# Patient Record
Sex: MALE | Race: WHITE | NOT HISPANIC OR LATINO | Employment: OTHER | URBAN - METROPOLITAN AREA
[De-identification: names, ages, dates, MRNs, and addresses within clinical notes are randomized per-mention and may not be internally consistent; named-entity substitution may affect disease eponyms.]

---

## 2018-01-09 ENCOUNTER — CONVERSION ENCOUNTER (OUTPATIENT)
Dept: PODIATRY | Facility: CLINIC | Age: 53
End: 2018-01-09

## 2018-01-09 ENCOUNTER — OFFICE VISIT CONVERTED (OUTPATIENT)
Dept: PODIATRY | Facility: CLINIC | Age: 53
End: 2018-01-09
Attending: PODIATRIST

## 2018-01-23 ENCOUNTER — CONVERSION ENCOUNTER (OUTPATIENT)
Dept: PODIATRY | Facility: CLINIC | Age: 53
End: 2018-01-23

## 2018-01-23 ENCOUNTER — OFFICE VISIT CONVERTED (OUTPATIENT)
Dept: PODIATRY | Facility: CLINIC | Age: 53
End: 2018-01-23
Attending: PODIATRIST

## 2018-02-22 ENCOUNTER — OFFICE VISIT CONVERTED (OUTPATIENT)
Dept: PODIATRY | Facility: CLINIC | Age: 53
End: 2018-02-22
Attending: PODIATRIST

## 2018-03-15 ENCOUNTER — OFFICE VISIT CONVERTED (OUTPATIENT)
Dept: PODIATRY | Facility: CLINIC | Age: 53
End: 2018-03-15
Attending: PODIATRIST

## 2018-04-13 ENCOUNTER — OFFICE VISIT CONVERTED (OUTPATIENT)
Dept: PODIATRY | Facility: CLINIC | Age: 53
End: 2018-04-13
Attending: PODIATRIST

## 2018-05-04 ENCOUNTER — OFFICE VISIT CONVERTED (OUTPATIENT)
Dept: PODIATRY | Facility: CLINIC | Age: 53
End: 2018-05-04
Attending: PODIATRIST

## 2018-05-25 ENCOUNTER — OFFICE VISIT CONVERTED (OUTPATIENT)
Dept: PODIATRY | Facility: CLINIC | Age: 53
End: 2018-05-25
Attending: PODIATRIST

## 2018-06-27 ENCOUNTER — CONVERSION ENCOUNTER (OUTPATIENT)
Dept: OTHER | Facility: HOSPITAL | Age: 53
End: 2018-06-27

## 2018-06-27 ENCOUNTER — OFFICE VISIT CONVERTED (OUTPATIENT)
Dept: OTHER | Facility: HOSPITAL | Age: 53
End: 2018-06-27
Attending: NURSE PRACTITIONER

## 2018-07-27 ENCOUNTER — OFFICE VISIT CONVERTED (OUTPATIENT)
Dept: PODIATRY | Facility: CLINIC | Age: 53
End: 2018-07-27
Attending: PODIATRIST

## 2018-10-11 ENCOUNTER — OFFICE VISIT CONVERTED (OUTPATIENT)
Dept: OTHER | Facility: HOSPITAL | Age: 53
End: 2018-10-11
Attending: NURSE PRACTITIONER

## 2018-10-11 ENCOUNTER — CONVERSION ENCOUNTER (OUTPATIENT)
Dept: OTHER | Facility: HOSPITAL | Age: 53
End: 2018-10-11

## 2018-11-27 ENCOUNTER — OFFICE VISIT CONVERTED (OUTPATIENT)
Dept: OTHER | Facility: HOSPITAL | Age: 53
End: 2018-11-27
Attending: NURSE PRACTITIONER

## 2018-12-03 ENCOUNTER — OFFICE VISIT CONVERTED (OUTPATIENT)
Dept: NEUROLOGY | Facility: CLINIC | Age: 53
End: 2018-12-03
Attending: PSYCHIATRY & NEUROLOGY

## 2018-12-03 ENCOUNTER — CONVERSION ENCOUNTER (OUTPATIENT)
Dept: NEUROLOGY | Facility: CLINIC | Age: 53
End: 2018-12-03

## 2018-12-10 ENCOUNTER — CONVERSION ENCOUNTER (OUTPATIENT)
Dept: CARDIOLOGY | Facility: CLINIC | Age: 53
End: 2018-12-10
Attending: INTERNAL MEDICINE

## 2019-02-21 ENCOUNTER — OFFICE VISIT CONVERTED (OUTPATIENT)
Dept: OTHER | Facility: HOSPITAL | Age: 54
End: 2019-02-21
Attending: NURSE PRACTITIONER

## 2019-02-21 ENCOUNTER — CONVERSION ENCOUNTER (OUTPATIENT)
Dept: OTHER | Facility: HOSPITAL | Age: 54
End: 2019-02-21

## 2019-02-21 ENCOUNTER — HOSPITAL ENCOUNTER (OUTPATIENT)
Dept: OTHER | Facility: HOSPITAL | Age: 54
Discharge: HOME | End: 2019-02-21

## 2019-02-21 LAB
ALBUMIN SERPL-MCNC: 3.5 G/DL (ref 3.5–5)
ALBUMIN/GLOB SERPL: 1.1 {RATIO} (ref 1.4–2.6)
ALP SERPL-CCNC: 87 U/L (ref 56–119)
ALT SERPL-CCNC: 9 U/L (ref 10–40)
ANION GAP SERPL CALC-SCNC: 15 MMOL/L (ref 8–19)
AST SERPL-CCNC: 10 U/L (ref 15–50)
BASOPHILS # BLD AUTO: 0.06 10*3/UL (ref 0–0.2)
BASOPHILS NFR BLD AUTO: 0.7 % (ref 0–3)
BILIRUB SERPL-MCNC: 0.28 MG/DL (ref 0.2–1.3)
BUN SERPL-MCNC: 35 MG/DL (ref 5–25)
BUN/CREAT SERPL: 14 {RATIO} (ref 6–20)
CALCIUM SERPL-MCNC: 9.7 MG/DL (ref 8.7–10.4)
CHLORIDE SERPL-SCNC: 107 MMOL/L (ref 99–111)
CONV ABS IMM GRAN: 0.02 10*3/UL (ref 0–0.2)
CONV CO2: 23 MMOL/L (ref 22–32)
CONV IMMATURE GRAN: 0.2 % (ref 0–1.8)
CONV TOTAL PROTEIN: 6.6 G/DL (ref 6.3–8.2)
CREAT UR-MCNC: 2.45 MG/DL (ref 0.7–1.2)
DEPRECATED RDW RBC AUTO: 39.5 FL (ref 35.1–43.9)
EOSINOPHIL # BLD AUTO: 0.31 10*3/UL (ref 0–0.7)
EOSINOPHIL # BLD AUTO: 3.5 % (ref 0–7)
ERYTHROCYTE [DISTWIDTH] IN BLOOD BY AUTOMATED COUNT: 11.7 % (ref 11.6–14.4)
EST. AVERAGE GLUCOSE BLD GHB EST-MCNC: 189 MG/DL
FOLATE SERPL-MCNC: 10.4 NG/ML (ref 4.8–20)
GFR SERPLBLD BASED ON 1.73 SQ M-ARVRAT: 29 ML/MIN/{1.73_M2}
GLOBULIN UR ELPH-MCNC: 3.1 G/DL (ref 2–3.5)
GLUCOSE SERPL-MCNC: 198 MG/DL (ref 70–99)
HBA1C MFR BLD: 13.5 G/DL (ref 14–18)
HBA1C MFR BLD: 8.2 % (ref 3.5–5.7)
HCT VFR BLD AUTO: 40.3 % (ref 42–52)
LYMPHOCYTES # BLD AUTO: 2.28 10*3/UL (ref 1–5)
MCH RBC QN AUTO: 30.6 PG (ref 27–31)
MCHC RBC AUTO-ENTMCNC: 33.5 G/DL (ref 33–37)
MCV RBC AUTO: 91.4 FL (ref 80–96)
MONOCYTES # BLD AUTO: 0.69 10*3/UL (ref 0.2–1.2)
MONOCYTES NFR BLD AUTO: 7.8 % (ref 3–10)
NEUTROPHILS # BLD AUTO: 5.49 10*3/UL (ref 2–8)
NEUTROPHILS NFR BLD AUTO: 62 % (ref 30–85)
NRBC CBCN: 0 % (ref 0–0.7)
OSMOLALITY SERPL CALC.SUM OF ELEC: 304 MOSM/KG (ref 273–304)
PLATELET # BLD AUTO: 275 10*3/UL (ref 130–400)
PMV BLD AUTO: 12 FL (ref 9.4–12.4)
POTASSIUM SERPL-SCNC: 5.2 MMOL/L (ref 3.5–5.3)
RBC # BLD AUTO: 4.41 10*6/UL (ref 4.7–6.1)
SODIUM SERPL-SCNC: 140 MMOL/L (ref 135–147)
T4 FREE SERPL-MCNC: 1.5 NG/DL (ref 0.9–1.8)
TSH SERPL-ACNC: 0.48 M[IU]/L (ref 0.27–4.2)
VARIANT LYMPHS NFR BLD MANUAL: 25.8 % (ref 20–45)
VIT B12 SERPL-MCNC: 454 PG/ML (ref 211–911)
WBC # BLD AUTO: 8.85 10*3/UL (ref 4.8–10.8)

## 2019-02-22 LAB
FERRITIN SERPL-MCNC: 468 NG/ML (ref 30–300)
IRON SATN MFR SERPL: 23 % (ref 20–55)
IRON SERPL-MCNC: 53 UG/DL (ref 70–180)
TIBC SERPL-MCNC: 232 UG/DL (ref 245–450)
TRANSFERRIN SERPL-MCNC: 162 MG/DL (ref 215–365)

## 2019-02-25 LAB
CONV ANTI MICROSOMAL AB: 25 IU/ML (ref 0–34)
THYROGLOBULIN ANTIBODY: <1 IU/ML (ref 0–0.9)

## 2019-03-21 ENCOUNTER — OFFICE VISIT CONVERTED (OUTPATIENT)
Dept: OTHER | Facility: HOSPITAL | Age: 54
End: 2019-03-21
Attending: INTERNAL MEDICINE

## 2019-04-18 ENCOUNTER — HOSPITAL ENCOUNTER (OUTPATIENT)
Dept: GENERAL RADIOLOGY | Facility: HOSPITAL | Age: 54
Discharge: HOME | End: 2019-04-18

## 2019-05-08 ENCOUNTER — OFFICE VISIT CONVERTED (OUTPATIENT)
Dept: OTHER | Facility: HOSPITAL | Age: 54
End: 2019-05-08
Attending: NURSE PRACTITIONER

## 2019-05-08 ENCOUNTER — CONVERSION ENCOUNTER (OUTPATIENT)
Dept: OTHER | Facility: HOSPITAL | Age: 54
End: 2019-05-08

## 2019-05-08 ENCOUNTER — HOSPITAL ENCOUNTER (OUTPATIENT)
Dept: OTHER | Facility: HOSPITAL | Age: 54
Discharge: HOME | End: 2019-05-08

## 2019-05-08 LAB
EST. AVERAGE GLUCOSE BLD GHB EST-MCNC: 131 MG/DL
HBA1C MFR BLD: 6.2 % (ref 3.5–5.7)

## 2019-06-04 ENCOUNTER — OFFICE VISIT CONVERTED (OUTPATIENT)
Dept: CARDIOLOGY | Facility: CLINIC | Age: 54
End: 2019-06-04
Attending: SPECIALIST

## 2019-06-28 ENCOUNTER — OFFICE VISIT CONVERTED (OUTPATIENT)
Dept: NEUROLOGY | Facility: CLINIC | Age: 54
End: 2019-06-28
Attending: PSYCHIATRY & NEUROLOGY

## 2019-08-02 ENCOUNTER — CONVERSION ENCOUNTER (OUTPATIENT)
Dept: OTHER | Facility: HOSPITAL | Age: 54
End: 2019-08-02

## 2019-08-02 ENCOUNTER — OFFICE VISIT CONVERTED (OUTPATIENT)
Dept: OTHER | Facility: HOSPITAL | Age: 54
End: 2019-08-02
Attending: NURSE PRACTITIONER

## 2019-09-13 ENCOUNTER — OFFICE VISIT CONVERTED (OUTPATIENT)
Dept: OTHER | Facility: HOSPITAL | Age: 54
End: 2019-09-13
Attending: NURSE PRACTITIONER

## 2019-09-13 ENCOUNTER — CONVERSION ENCOUNTER (OUTPATIENT)
Dept: OTHER | Facility: HOSPITAL | Age: 54
End: 2019-09-13

## 2020-06-03 ENCOUNTER — TELEPHONE CONVERTED (OUTPATIENT)
Dept: GASTROENTEROLOGY | Facility: CLINIC | Age: 55
End: 2020-06-03
Attending: NURSE PRACTITIONER

## 2020-07-20 ENCOUNTER — HOSPITAL ENCOUNTER (OUTPATIENT)
Dept: ULTRASOUND IMAGING | Facility: HOSPITAL | Age: 55
Discharge: HOME | End: 2020-07-20
Attending: INTERNAL MEDICINE

## 2020-07-20 LAB
ABO GROUP BLD: NORMAL
ALBUMIN SERPL-MCNC: 2.6 G/DL (ref 3.5–5)
ALBUMIN/GLOB SERPL: 1 {RATIO} (ref 1.4–2.6)
ALP SERPL-CCNC: 107 U/L (ref 56–119)
ALT SERPL-CCNC: 7 U/L (ref 10–40)
ANION GAP SERPL CALC-SCNC: 20 MMOL/L (ref 8–19)
APTT BLD: 24.5 S (ref 22.2–34.2)
AST SERPL-CCNC: 10 U/L (ref 15–50)
BASOPHILS # BLD AUTO: 0.06 10*3/UL (ref 0–0.2)
BASOPHILS NFR BLD AUTO: 0.6 % (ref 0–3)
BILIRUB SERPL-MCNC: <0.15 MG/DL (ref 0.2–1.3)
BLD GP AB SCN SERPL QL: NORMAL
BUN SERPL-MCNC: 38 MG/DL (ref 5–25)
BUN/CREAT SERPL: 9 {RATIO} (ref 6–20)
CALCIUM SERPL-MCNC: 9.4 MG/DL (ref 8.7–10.4)
CHLORIDE SERPL-SCNC: 107 MMOL/L (ref 99–111)
CONV ABD CONTROL: NORMAL
CONV ABS IMM GRAN: 0.02 10*3/UL (ref 0–0.2)
CONV CO2: 18 MMOL/L (ref 22–32)
CONV IMMATURE GRAN: 0.2 % (ref 0–1.8)
CONV TOTAL PROTEIN: 5.2 G/DL (ref 6.3–8.2)
CREAT UR-MCNC: 4.13 MG/DL (ref 0.7–1.2)
DEPRECATED RDW RBC AUTO: 41 FL (ref 35.1–43.9)
EOSINOPHIL # BLD AUTO: 0.63 10*3/UL (ref 0–0.7)
EOSINOPHIL # BLD AUTO: 6.2 % (ref 0–7)
ERYTHROCYTE [DISTWIDTH] IN BLOOD BY AUTOMATED COUNT: 11.7 % (ref 11.6–14.4)
GFR SERPLBLD BASED ON 1.73 SQ M-ARVRAT: 15 ML/MIN/{1.73_M2}
GLOBULIN UR ELPH-MCNC: 2.6 G/DL (ref 2–3.5)
GLUCOSE SERPL-MCNC: 172 MG/DL (ref 70–99)
HCT VFR BLD AUTO: 31.5 % (ref 42–52)
HGB BLD-MCNC: 10.3 G/DL (ref 14–18)
INR PPP: 0.85 (ref 2–3)
LYMPHOCYTES # BLD AUTO: 1.64 10*3/UL (ref 1–5)
LYMPHOCYTES NFR BLD AUTO: 16.2 % (ref 20–45)
MCH RBC QN AUTO: 31.3 PG (ref 27–31)
MCHC RBC AUTO-ENTMCNC: 32.7 G/DL (ref 33–37)
MCV RBC AUTO: 95.7 FL (ref 80–96)
MONOCYTES # BLD AUTO: 0.58 10*3/UL (ref 0.2–1.2)
MONOCYTES NFR BLD AUTO: 5.7 % (ref 3–10)
NEUTROPHILS # BLD AUTO: 7.21 10*3/UL (ref 2–8)
NEUTROPHILS NFR BLD AUTO: 71.1 % (ref 30–85)
NRBC CBCN: 0 % (ref 0–0.7)
OSMOLALITY SERPL CALC.SUM OF ELEC: 303 MOSM/KG (ref 273–304)
PLATELET # BLD AUTO: 258 10*3/UL (ref 130–400)
PMV BLD AUTO: 9.9 FL (ref 9.4–12.4)
POTASSIUM SERPL-SCNC: 4.6 MMOL/L (ref 3.5–5.3)
PROTHROMBIN TIME: 9.4 S (ref 9.4–12)
RBC # BLD AUTO: 3.29 10*6/UL (ref 4.7–6.1)
RH BLD: NORMAL
SODIUM SERPL-SCNC: 140 MMOL/L (ref 135–147)
WBC # BLD AUTO: 10.14 10*3/UL (ref 4.8–10.8)

## 2021-01-13 ENCOUNTER — HOSPITAL ENCOUNTER (OUTPATIENT)
Dept: GENERAL RADIOLOGY | Facility: HOSPITAL | Age: 56
Discharge: HOME | End: 2021-01-13
Attending: FAMILY MEDICINE

## 2021-01-27 ENCOUNTER — HOSPITAL ENCOUNTER (OUTPATIENT)
Dept: PREADMISSION TESTING | Facility: HOSPITAL | Age: 56
Discharge: HOME | End: 2021-01-27
Attending: SURGERY

## 2021-01-27 LAB — SARS-COV-2 RNA SPEC QL NAA+PROBE: NOT DETECTED

## 2021-01-29 ENCOUNTER — HOSPITAL ENCOUNTER (OUTPATIENT)
Dept: PERIOP | Facility: HOSPITAL | Age: 56
Setting detail: HOSPITAL OUTPATIENT SURGERY
Discharge: HOME | End: 2021-01-29
Attending: SURGERY

## 2021-01-29 LAB
ANION GAP SERPL CALC-SCNC: 12 MMOL/L (ref 8–19)
BASOPHILS # BLD AUTO: 0.09 10*3/UL (ref 0–0.2)
BASOPHILS NFR BLD AUTO: 1 % (ref 0–3)
BUN SERPL-MCNC: 23 MG/DL (ref 5–25)
BUN/CREAT SERPL: 5 {RATIO} (ref 6–20)
CALCIUM SERPL-MCNC: 8.2 MG/DL (ref 8.7–10.4)
CHLORIDE SERPL-SCNC: 105 MMOL/L (ref 99–111)
CONV ABS IMM GRAN: 0.02 10*3/UL (ref 0–0.2)
CONV CO2: 25 MMOL/L (ref 22–32)
CONV IMMATURE GRAN: 0.2 % (ref 0–1.8)
CREAT UR-MCNC: 4.55 MG/DL (ref 0.7–1.2)
DEPRECATED RDW RBC AUTO: 45.1 FL (ref 35.1–43.9)
EOSINOPHIL # BLD AUTO: 0.44 10*3/UL (ref 0–0.7)
EOSINOPHIL # BLD AUTO: 5.1 % (ref 0–7)
ERYTHROCYTE [DISTWIDTH] IN BLOOD BY AUTOMATED COUNT: 13.2 % (ref 11.6–14.4)
GFR SERPLBLD BASED ON 1.73 SQ M-ARVRAT: 14 ML/MIN/{1.73_M2}
GLUCOSE BLD-MCNC: 128 MG/DL (ref 70–99)
GLUCOSE SERPL-MCNC: 162 MG/DL (ref 70–99)
HCT VFR BLD AUTO: 29.5 % (ref 42–52)
HGB BLD-MCNC: 9.7 G/DL (ref 14–18)
LYMPHOCYTES # BLD AUTO: 1.42 10*3/UL (ref 1–5)
LYMPHOCYTES NFR BLD AUTO: 16.4 % (ref 20–45)
MCH RBC QN AUTO: 31 PG (ref 27–31)
MCHC RBC AUTO-ENTMCNC: 32.9 G/DL (ref 33–37)
MCV RBC AUTO: 94.2 FL (ref 80–96)
MONOCYTES # BLD AUTO: 0.61 10*3/UL (ref 0.2–1.2)
MONOCYTES NFR BLD AUTO: 7 % (ref 3–10)
NEUTROPHILS # BLD AUTO: 6.1 10*3/UL (ref 2–8)
NEUTROPHILS NFR BLD AUTO: 70.3 % (ref 30–85)
NRBC CBCN: 0 % (ref 0–0.7)
OSMOLALITY SERPL CALC.SUM OF ELEC: 293 MOSM/KG (ref 273–304)
PLATELET # BLD AUTO: 239 10*3/UL (ref 130–400)
PMV BLD AUTO: 9.8 FL (ref 9.4–12.4)
POTASSIUM SERPL-SCNC: 4.1 MMOL/L (ref 3.5–5.3)
RBC # BLD AUTO: 3.13 10*6/UL (ref 4.7–6.1)
SODIUM SERPL-SCNC: 138 MMOL/L (ref 135–147)
WBC # BLD AUTO: 8.68 10*3/UL (ref 4.8–10.8)

## 2021-03-09 ENCOUNTER — CONVERSION ENCOUNTER (OUTPATIENT)
Dept: SURGERY | Facility: CLINIC | Age: 56
End: 2021-03-09

## 2021-03-09 ENCOUNTER — OFFICE VISIT CONVERTED (OUTPATIENT)
Dept: SURGERY | Facility: CLINIC | Age: 56
End: 2021-03-09
Attending: SURGERY

## 2021-03-12 ENCOUNTER — HOSPITAL ENCOUNTER (OUTPATIENT)
Dept: PREADMISSION TESTING | Facility: HOSPITAL | Age: 56
Discharge: HOME | End: 2021-03-12
Attending: SURGERY

## 2021-03-12 LAB — SARS-COV-2 RNA SPEC QL NAA+PROBE: NOT DETECTED

## 2021-03-19 ENCOUNTER — HOSPITAL ENCOUNTER (OUTPATIENT)
Dept: PERIOP | Facility: HOSPITAL | Age: 56
Setting detail: HOSPITAL OUTPATIENT SURGERY
Discharge: HOME | End: 2021-03-19
Attending: SURGERY

## 2021-03-19 LAB
ANION GAP SERPL CALC-SCNC: 15 MMOL/L (ref 8–19)
BASOPHILS # BLD AUTO: 0.07 10*3/UL (ref 0–0.2)
BASOPHILS NFR BLD AUTO: 0.7 % (ref 0–3)
BUN SERPL-MCNC: 16 MG/DL (ref 5–25)
BUN/CREAT SERPL: 5 {RATIO} (ref 6–20)
CALCIUM SERPL-MCNC: 8.6 MG/DL (ref 8.7–10.4)
CHLORIDE SERPL-SCNC: 99 MMOL/L (ref 99–111)
CONV ABS IMM GRAN: 0.02 10*3/UL (ref 0–0.2)
CONV CO2: 25 MMOL/L (ref 22–32)
CONV IMMATURE GRAN: 0.2 % (ref 0–1.8)
CREAT UR-MCNC: 3.43 MG/DL (ref 0.7–1.2)
DEPRECATED RDW RBC AUTO: 43.2 FL (ref 35.1–43.9)
EOSINOPHIL # BLD AUTO: 0.21 10*3/UL (ref 0–0.7)
EOSINOPHIL # BLD AUTO: 2.1 % (ref 0–7)
ERYTHROCYTE [DISTWIDTH] IN BLOOD BY AUTOMATED COUNT: 12.6 % (ref 11.6–14.4)
GFR SERPLBLD BASED ON 1.73 SQ M-ARVRAT: 19 ML/MIN/{1.73_M2}
GLUCOSE BLD-MCNC: 165 MG/DL (ref 70–99)
GLUCOSE SERPL-MCNC: 220 MG/DL (ref 70–99)
HCT VFR BLD AUTO: 39.4 % (ref 42–52)
HGB BLD-MCNC: 12.7 G/DL (ref 14–18)
LYMPHOCYTES # BLD AUTO: 0.91 10*3/UL (ref 1–5)
LYMPHOCYTES NFR BLD AUTO: 9 % (ref 20–45)
MCH RBC QN AUTO: 30.2 PG (ref 27–31)
MCHC RBC AUTO-ENTMCNC: 32.2 G/DL (ref 33–37)
MCV RBC AUTO: 93.6 FL (ref 80–96)
MONOCYTES # BLD AUTO: 0.73 10*3/UL (ref 0.2–1.2)
MONOCYTES NFR BLD AUTO: 7.2 % (ref 3–10)
NEUTROPHILS # BLD AUTO: 8.18 10*3/UL (ref 2–8)
NEUTROPHILS NFR BLD AUTO: 80.8 % (ref 30–85)
NRBC CBCN: 0 % (ref 0–0.7)
OSMOLALITY SERPL CALC.SUM OF ELEC: 286 MOSM/KG (ref 273–304)
PLATELET # BLD AUTO: 283 10*3/UL (ref 130–400)
PMV BLD AUTO: 9.4 FL (ref 9.4–12.4)
POTASSIUM SERPL-SCNC: 4.6 MMOL/L (ref 3.5–5.3)
RBC # BLD AUTO: 4.21 10*6/UL (ref 4.7–6.1)
SODIUM SERPL-SCNC: 134 MMOL/L (ref 135–147)
WBC # BLD AUTO: 10.12 10*3/UL (ref 4.8–10.8)

## 2021-03-23 ENCOUNTER — HOSPITAL ENCOUNTER (OUTPATIENT)
Dept: CARDIOLOGY | Facility: HOSPITAL | Age: 56
Discharge: HOME | End: 2021-03-23
Attending: SURGERY

## 2021-03-26 ENCOUNTER — HOSPITAL ENCOUNTER (OUTPATIENT)
Dept: PREADMISSION TESTING | Facility: HOSPITAL | Age: 56
Discharge: HOME | End: 2021-03-26
Attending: SURGERY

## 2021-03-26 LAB — SARS-COV-2 RNA SPEC QL NAA+PROBE: NOT DETECTED

## 2021-03-31 ENCOUNTER — HOSPITAL ENCOUNTER (OUTPATIENT)
Dept: PERIOP | Facility: HOSPITAL | Age: 56
Setting detail: HOSPITAL OUTPATIENT SURGERY
Discharge: HOME | End: 2021-03-31
Attending: SURGERY

## 2021-03-31 LAB
ANION GAP SERPL CALC-SCNC: 14 MMOL/L (ref 8–19)
APTT BLD: 25.2 S (ref 22.2–34.2)
BASOPHILS # BLD AUTO: 0.11 10*3/UL (ref 0–0.2)
BASOPHILS NFR BLD AUTO: 1.3 % (ref 0–3)
BUN SERPL-MCNC: 30 MG/DL (ref 5–25)
BUN/CREAT SERPL: 5 {RATIO} (ref 6–20)
CALCIUM SERPL-MCNC: 9.1 MG/DL (ref 8.7–10.4)
CHLORIDE SERPL-SCNC: 105 MMOL/L (ref 99–111)
CONV ABS IMM GRAN: 0.02 10*3/UL (ref 0–0.2)
CONV CO2: 24 MMOL/L (ref 22–32)
CONV IMMATURE GRAN: 0.2 % (ref 0–1.8)
CREAT UR-MCNC: 5.66 MG/DL (ref 0.7–1.2)
DEPRECATED RDW RBC AUTO: 43.1 FL (ref 35.1–43.9)
EOSINOPHIL # BLD AUTO: 0.46 10*3/UL (ref 0–0.7)
EOSINOPHIL # BLD AUTO: 5.3 % (ref 0–7)
ERYTHROCYTE [DISTWIDTH] IN BLOOD BY AUTOMATED COUNT: 12.2 % (ref 11.6–14.4)
GFR SERPLBLD BASED ON 1.73 SQ M-ARVRAT: 10 ML/MIN/{1.73_M2}
GLUCOSE BLD-MCNC: 63 MG/DL (ref 70–99)
GLUCOSE BLD-MCNC: 79 MG/DL (ref 70–99)
GLUCOSE SERPL-MCNC: 117 MG/DL (ref 70–99)
HCT VFR BLD AUTO: 39.7 % (ref 42–52)
HGB BLD-MCNC: 12.6 G/DL (ref 14–18)
INR PPP: 0.85 (ref 2–3)
LYMPHOCYTES # BLD AUTO: 1.44 10*3/UL (ref 1–5)
LYMPHOCYTES NFR BLD AUTO: 16.5 % (ref 20–45)
MCH RBC QN AUTO: 30.5 PG (ref 27–31)
MCHC RBC AUTO-ENTMCNC: 31.7 G/DL (ref 33–37)
MCV RBC AUTO: 96.1 FL (ref 80–96)
MONOCYTES # BLD AUTO: 0.57 10*3/UL (ref 0.2–1.2)
MONOCYTES NFR BLD AUTO: 6.5 % (ref 3–10)
NEUTROPHILS # BLD AUTO: 6.12 10*3/UL (ref 2–8)
NEUTROPHILS NFR BLD AUTO: 70.2 % (ref 30–85)
NRBC CBCN: 0 % (ref 0–0.7)
OSMOLALITY SERPL CALC.SUM OF ELEC: 293 MOSM/KG (ref 273–304)
PLATELET # BLD AUTO: 248 10*3/UL (ref 130–400)
PMV BLD AUTO: 9.5 FL (ref 9.4–12.4)
POTASSIUM SERPL-SCNC: 5.3 MMOL/L (ref 3.5–5.3)
PROTHROMBIN TIME: 9.6 S (ref 9.4–12)
RBC # BLD AUTO: 4.13 10*6/UL (ref 4.7–6.1)
SODIUM SERPL-SCNC: 138 MMOL/L (ref 135–147)
WBC # BLD AUTO: 8.72 10*3/UL (ref 4.8–10.8)

## 2021-04-12 ENCOUNTER — HOSPITAL ENCOUNTER (OUTPATIENT)
Dept: PERIOP | Facility: HOSPITAL | Age: 56
Discharge: HOME | End: 2021-04-12
Attending: SURGERY

## 2021-04-12 LAB
ANION GAP SERPL CALC-SCNC: 14 MMOL/L (ref 8–19)
APTT BLD: 24.2 S (ref 22.2–34.2)
BASOPHILS # BLD AUTO: 0.1 10*3/UL (ref 0–0.2)
BASOPHILS NFR BLD AUTO: 1.1 % (ref 0–3)
BUN SERPL-MCNC: 41 MG/DL (ref 5–25)
BUN/CREAT SERPL: 7 {RATIO} (ref 6–20)
CALCIUM SERPL-MCNC: 8.7 MG/DL (ref 8.7–10.4)
CHLORIDE SERPL-SCNC: 105 MMOL/L (ref 99–111)
CONV ABS IMM GRAN: 0.02 10*3/UL (ref 0–0.2)
CONV CO2: 23 MMOL/L (ref 22–32)
CONV IMMATURE GRAN: 0.2 % (ref 0–1.8)
CREAT UR-MCNC: 6.25 MG/DL (ref 0.7–1.2)
DEPRECATED RDW RBC AUTO: 43.4 FL (ref 35.1–43.9)
EOSINOPHIL # BLD AUTO: 0.65 10*3/UL (ref 0–0.7)
EOSINOPHIL # BLD AUTO: 6.9 % (ref 0–7)
ERYTHROCYTE [DISTWIDTH] IN BLOOD BY AUTOMATED COUNT: 12.4 % (ref 11.6–14.4)
GFR SERPLBLD BASED ON 1.73 SQ M-ARVRAT: 9 ML/MIN/{1.73_M2}
GLUCOSE SERPL-MCNC: 172 MG/DL (ref 70–99)
HCT VFR BLD AUTO: 37.8 % (ref 42–52)
HGB BLD-MCNC: 12 G/DL (ref 14–18)
INR PPP: 0.86 (ref 2–3)
LYMPHOCYTES # BLD AUTO: 1.21 10*3/UL (ref 1–5)
LYMPHOCYTES NFR BLD AUTO: 12.8 % (ref 20–45)
MCH RBC QN AUTO: 30.4 PG (ref 27–31)
MCHC RBC AUTO-ENTMCNC: 31.7 G/DL (ref 33–37)
MCV RBC AUTO: 95.7 FL (ref 80–96)
MONOCYTES # BLD AUTO: 0.52 10*3/UL (ref 0.2–1.2)
MONOCYTES NFR BLD AUTO: 5.5 % (ref 3–10)
NEUTROPHILS # BLD AUTO: 6.93 10*3/UL (ref 2–8)
NEUTROPHILS NFR BLD AUTO: 73.5 % (ref 30–85)
NRBC CBCN: 0 % (ref 0–0.7)
OSMOLALITY SERPL CALC.SUM OF ELEC: 298 MOSM/KG (ref 273–304)
PLATELET # BLD AUTO: 232 10*3/UL (ref 130–400)
PMV BLD AUTO: 9.7 FL (ref 9.4–12.4)
POTASSIUM SERPL-SCNC: 4.7 MMOL/L (ref 3.5–5.3)
PROTHROMBIN TIME: 9.7 S (ref 9.4–12)
RBC # BLD AUTO: 3.95 10*6/UL (ref 4.7–6.1)
SODIUM SERPL-SCNC: 137 MMOL/L (ref 135–147)
WBC # BLD AUTO: 9.43 10*3/UL (ref 4.8–10.8)

## 2021-05-10 NOTE — H&P
History and Physical      Patient Name: Michael Ponce   Patient ID: [id number]   Sex: Male   YOB: 1965    Primary Care Provider: Kymberly Otto MD   Referring Provider: Kymberly Otto MD    Visit Date: March 9, 2021    Provider: Rian Scott MD   Location: [facility]   Location Address: [address] [place] [address]  [id number]   Location Phone: [phone]          Chief Complaint  · hemodialysis cath      History Of Present Illness  Michael Ponce is a 55 year old /White male who follows-up after PD catheter placement. He follows-up today for evaluation for removal of his PD catheter. The patient reports that after the PD catheter was placed, he has had a stroke and some other issues and he is unable to care for PD catheter by himself at home. He feels more comfortable getting hemodialysis and he has been converted to hemodialysis. He had a TDC at the same time I placed the peritoneal dialysis catheter. Seeing as he more comfortable with hemodialysis, he just wants to go ahead and remove the PD cath although he reports the PD catheter flushed and worked appropriately without any difficulties.       Past Medical History  Ankle pain; Arthritis; Asthma; Bladder problem; Congestive heart failure; COPD (chronic obstructive pulmonary disease); Decubitus ulcer of foot, stage 1; Diabetes; Diabetes mellitus, insulin dependent (IDDM), controlled; Foot amputation status, right; GERD; GERD (gastroesophageal reflux disease); High blood pressure; High cholesterol; Kidney Disease; Leg pain; Leg swelling; Limb Pain; Limb Swelling; Migraine; Muscle cramps; Numbness in feet; Polyneuropathy; Right foot pain; Screening for prostate cancer; Stroke; Type 1 diabetes mellitus; Wound dehiscence         Past Surgical History  *Metal Implant; Foot surgery; Fracture Repair; Joint Surgery; Neck Surgery         Medication List  hydrocodone-acetaminophen 7.5-325 mg oral tablet;  "losartan 50 mg oral tablet; Pepcid AC 20 mg oral tablet         Allergy List  I.V. Dye; PENICILLINS; Red Dye; SULFA (SULFONAMIDES)       Allergies Reconciled  Family Medical History  Stroke; Heart Disease; Cancer, Unspecified; Diabetes, unspecified type; Diabetes Mellitus, Type II; No family history of colorectal cancer; Family history of Arthritis; Family history of cancer; Family history of stroke; Family history of heart disease; Family history of diabetes mellitus         Social History  Alcohol (Never); Denies substance abuse (Never); lives with children; ; Retired; Tobacco (Current every day)         Immunizations  Name Date Admin   Influenza 11/01/2018         Review of Systems  · Gastrointestinal  o Denies  o : nausea, vomiting, diarrhea, constipation      Vitals  Date Time BP Position Site L\R Cuff Size HR RR TEMP (F) WT  HT  BMI kg/m2 BSA m2 O2 Sat FR L/min FiO2 HC       03/09/2021 03:14 PM         151lbs 2oz 5'  11\" 21.08 1.85             Physical Examination  · Constitutional  o Appearance  o : well developed, well-nourished, alert and in no acute distress  · Head and Face  o Head  o :   § Inspection  § : no deformities or lesions  · Eyes  o Conjunctivae  o : clear  o Sclerae  o : clear  · Neck  o Inspection/Palpation  o : normal appearance, no masses or tenderness, trachea midline  · Respiratory  o Respiratory Effort  o : breathing unlabored  o Inspection of Chest  o : normal appearance, no retractions  · Cardiovascular  o Heart  o : regular rate and rhythm  · Gastrointestinal  o Abdominal Examination  o : abdomen is soft. PD catheter is in place.   · Lymphatic  o Neck  o : no lymphadenopathy present  o Axilla  o : no lymphadenopathy present  o Groin  o : no lymphadenopathy present  · Skin and Subcutaneous Tissue  o General Inspection  o : no rashes present, no lesions present, no areas of discoloration  · Neurologic  o Cranial Nerves  o : grossly intact  o Sensation  o : grossly intact  o Gait " and Station  o :   § Gait Screening  § : normal gait, able to stand without diffculty  o Cerebellar Function  o : no obvious abnormalities  · Psychiatric  o Judgement and Insight  o : judgment and insight intact  o Mood and Affect  o : mood normal, affect appropriate          Assessment  · Pre-Surgical Orders     V72.84  · Pre-op testing     V72.84/Z01.818  · Peritoneal dialysis catheter in place     V45.11/Z99.2       Patient no longer able to use his PD catheter.       Plan  · Orders  o General Surgery Order (GENOR) - V45.11/Z99.2 - 03/17/2021  o MG Pre-Op Covid-19 Screening (01694) - V72.84/Z01.818 - 03/12/2021   [facility]/12/21 @ 10am. [address]  o VASCULAR SURGERY CONSULTATION (VASCU) - - 03/09/2021  · Medications  o Medications have been Reconciled  o Transition of Care or Provider Policy  · Instructions  o PLAN:   o Handouts Provided-Pre-Procedure Instructions including date and time and location of procedure.  o Surgical Facility: [facility]  o ****Patient Status****  o Outpatient  o ********************  o RISK AND BENEFITS:  o Consent for surgery: Given these options, the patient has verbally expressed an understanding of the risks of surgery and finds these risks acceptable. We will proceed with surgery as soon as possible.  o Consult Anesthesia for any post-operative block, or any pain management procedure deemed necessary by the anestesiologist for adequate post-operative pain control.   o O.R. PREP: Per protocol  o IV: Per Anesthesia  o IV: LR@ 75ml/hr  o PLEASE SIGN PERMIT FOR: Removal of PD Cath  o *__Clindamycin 900 mg IV on call to OR.  o *___The above History and Physical Examination has been completed within 30 days of admission.  o Pre-Admission Testing Date: Phone Screen 3/8/21 @11am.  o Electronically Identified Patient Education Materials Provided Electronically     We will plan for PD catheter removal. Risks, benefits, and alternatives were discussed with the patient  extensively. All questions were answered. The patient voiced understanding and agrees to proceed with the above plan.     Of note, I did refer him to Dr. Chen at the vascular clinic for vein mapping and placement of fistula. They will work on getting him set up for that.             Electronically Signed by: Keisha Degroot-, -Author on March 10, 2021 09:16:47 AM  Electronically Co-signed by: Rian Scott MD -Reviewer on March 10, 2021 09:50:16 AM

## 2021-05-14 ENCOUNTER — HOSPITAL ENCOUNTER (OUTPATIENT)
Dept: PERIOP | Facility: HOSPITAL | Age: 56
Discharge: HOME | End: 2021-05-14
Attending: RADIOLOGY

## 2021-05-14 VITALS — BODY MASS INDEX: 21.16 KG/M2 | WEIGHT: 151.12 LBS | HEIGHT: 71 IN

## 2021-05-14 LAB
ANION GAP SERPL CALC-SCNC: 14 MMOL/L (ref 8–19)
APTT BLD: 25.7 S (ref 22.2–34.2)
BASOPHILS # BLD AUTO: 0.09 10*3/UL (ref 0–0.2)
BASOPHILS NFR BLD AUTO: 1.3 % (ref 0–3)
BUN SERPL-MCNC: 35 MG/DL (ref 5–25)
BUN/CREAT SERPL: 6 {RATIO} (ref 6–20)
CALCIUM SERPL-MCNC: 8.5 MG/DL (ref 8.7–10.4)
CHLORIDE SERPL-SCNC: 100 MMOL/L (ref 99–111)
CONV ABS IMM GRAN: 0.01 10*3/UL (ref 0–0.2)
CONV CO2: 26 MMOL/L (ref 22–32)
CONV IMMATURE GRAN: 0.1 % (ref 0–1.8)
CREAT UR-MCNC: 6.02 MG/DL (ref 0.7–1.2)
DEPRECATED RDW RBC AUTO: 46 FL (ref 35.1–43.9)
EOSINOPHIL # BLD AUTO: 0.42 10*3/UL (ref 0–0.7)
EOSINOPHIL # BLD AUTO: 5.8 % (ref 0–7)
ERYTHROCYTE [DISTWIDTH] IN BLOOD BY AUTOMATED COUNT: 13.3 % (ref 11.6–14.4)
GFR SERPLBLD BASED ON 1.73 SQ M-ARVRAT: 10 ML/MIN/{1.73_M2}
GLUCOSE SERPL-MCNC: 111 MG/DL (ref 70–99)
HCT VFR BLD AUTO: 38.7 % (ref 42–52)
HGB BLD-MCNC: 12.6 G/DL (ref 14–18)
INR PPP: 0.83 (ref 2–3)
LYMPHOCYTES # BLD AUTO: 1.21 10*3/UL (ref 1–5)
LYMPHOCYTES NFR BLD AUTO: 16.8 % (ref 20–45)
MCH RBC QN AUTO: 30.7 PG (ref 27–31)
MCHC RBC AUTO-ENTMCNC: 32.6 G/DL (ref 33–37)
MCV RBC AUTO: 94.4 FL (ref 80–96)
MONOCYTES # BLD AUTO: 0.59 10*3/UL (ref 0.2–1.2)
MONOCYTES NFR BLD AUTO: 8.2 % (ref 3–10)
NEUTROPHILS # BLD AUTO: 4.88 10*3/UL (ref 2–8)
NEUTROPHILS NFR BLD AUTO: 67.8 % (ref 30–85)
NRBC CBCN: 0 % (ref 0–0.7)
OSMOLALITY SERPL CALC.SUM OF ELEC: 291 MOSM/KG (ref 273–304)
PLATELET # BLD AUTO: 230 10*3/UL (ref 130–400)
PMV BLD AUTO: 9 FL (ref 9.4–12.4)
POTASSIUM SERPL-SCNC: 4.1 MMOL/L (ref 3.5–5.3)
PROTHROMBIN TIME: 9.6 S (ref 9.4–12)
RBC # BLD AUTO: 4.1 10*6/UL (ref 4.7–6.1)
SODIUM SERPL-SCNC: 136 MMOL/L (ref 135–147)
WBC # BLD AUTO: 7.2 10*3/UL (ref 4.8–10.8)

## 2021-05-15 VITALS
DIASTOLIC BLOOD PRESSURE: 79 MMHG | BODY MASS INDEX: 22.82 KG/M2 | WEIGHT: 163 LBS | HEIGHT: 71 IN | SYSTOLIC BLOOD PRESSURE: 165 MMHG | HEART RATE: 82 BPM

## 2021-05-15 VITALS
BODY MASS INDEX: 22.96 KG/M2 | OXYGEN SATURATION: 99 % | DIASTOLIC BLOOD PRESSURE: 88 MMHG | WEIGHT: 164 LBS | SYSTOLIC BLOOD PRESSURE: 152 MMHG | HEIGHT: 71 IN | HEART RATE: 88 BPM | TEMPERATURE: 97.8 F | RESPIRATION RATE: 18 BRPM

## 2021-05-15 VITALS
HEART RATE: 93 BPM | SYSTOLIC BLOOD PRESSURE: 158 MMHG | TEMPERATURE: 97.6 F | OXYGEN SATURATION: 100 % | HEIGHT: 71 IN | RESPIRATION RATE: 18 BRPM | DIASTOLIC BLOOD PRESSURE: 60 MMHG | WEIGHT: 163 LBS | BODY MASS INDEX: 22.82 KG/M2

## 2021-05-15 VITALS
DIASTOLIC BLOOD PRESSURE: 88 MMHG | SYSTOLIC BLOOD PRESSURE: 184 MMHG | HEIGHT: 71 IN | WEIGHT: 160 LBS | BODY MASS INDEX: 22.4 KG/M2 | HEART RATE: 84 BPM

## 2021-05-15 VITALS
BODY MASS INDEX: 23.24 KG/M2 | HEART RATE: 78 BPM | WEIGHT: 166 LBS | RESPIRATION RATE: 18 BRPM | SYSTOLIC BLOOD PRESSURE: 110 MMHG | DIASTOLIC BLOOD PRESSURE: 65 MMHG | TEMPERATURE: 98.5 F | HEIGHT: 71 IN | OXYGEN SATURATION: 100 %

## 2021-05-15 VITALS — DIASTOLIC BLOOD PRESSURE: 74 MMHG | SYSTOLIC BLOOD PRESSURE: 138 MMHG

## 2021-05-15 VITALS — BODY MASS INDEX: 24.08 KG/M2 | WEIGHT: 172 LBS | HEIGHT: 71 IN

## 2021-05-16 VITALS — OXYGEN SATURATION: 98 % | BODY MASS INDEX: 30.66 KG/M2 | WEIGHT: 219 LBS | HEART RATE: 97 BPM | HEIGHT: 71 IN

## 2021-05-16 VITALS — HEART RATE: 90 BPM | WEIGHT: 223 LBS | BODY MASS INDEX: 31.22 KG/M2 | HEIGHT: 71 IN | OXYGEN SATURATION: 99 %

## 2021-05-16 VITALS — HEART RATE: 97 BPM | WEIGHT: 225 LBS | HEIGHT: 71 IN | OXYGEN SATURATION: 98 % | BODY MASS INDEX: 31.5 KG/M2

## 2021-05-16 VITALS — WEIGHT: 219 LBS | HEIGHT: 71 IN | BODY MASS INDEX: 30.66 KG/M2 | HEART RATE: 66 BPM | OXYGEN SATURATION: 98 %

## 2021-05-16 VITALS — HEART RATE: 95 BPM | WEIGHT: 226 LBS | HEIGHT: 71 IN | OXYGEN SATURATION: 98 % | BODY MASS INDEX: 31.64 KG/M2

## 2021-05-16 VITALS
RESPIRATION RATE: 18 BRPM | TEMPERATURE: 98 F | BODY MASS INDEX: 28.56 KG/M2 | SYSTOLIC BLOOD PRESSURE: 148 MMHG | HEART RATE: 74 BPM | OXYGEN SATURATION: 100 % | HEIGHT: 71 IN | DIASTOLIC BLOOD PRESSURE: 94 MMHG | WEIGHT: 204 LBS

## 2021-05-16 VITALS
HEIGHT: 71 IN | DIASTOLIC BLOOD PRESSURE: 66 MMHG | SYSTOLIC BLOOD PRESSURE: 121 MMHG | WEIGHT: 187.56 LBS | BODY MASS INDEX: 26.26 KG/M2 | HEART RATE: 96 BPM

## 2021-05-16 VITALS
HEART RATE: 84 BPM | HEIGHT: 71 IN | DIASTOLIC BLOOD PRESSURE: 87 MMHG | WEIGHT: 165 LBS | SYSTOLIC BLOOD PRESSURE: 124 MMHG | TEMPERATURE: 99.7 F | RESPIRATION RATE: 18 BRPM | BODY MASS INDEX: 23.1 KG/M2 | OXYGEN SATURATION: 100 %

## 2021-05-16 VITALS
HEIGHT: 71 IN | WEIGHT: 223 LBS | BODY MASS INDEX: 31.22 KG/M2 | SYSTOLIC BLOOD PRESSURE: 156 MMHG | HEART RATE: 70 BPM | RESPIRATION RATE: 18 BRPM | TEMPERATURE: 98.2 F | DIASTOLIC BLOOD PRESSURE: 92 MMHG | OXYGEN SATURATION: 98 %

## 2021-05-16 VITALS — HEART RATE: 93 BPM | HEIGHT: 71 IN | BODY MASS INDEX: 31.08 KG/M2 | WEIGHT: 222 LBS | OXYGEN SATURATION: 98 %

## 2021-05-16 VITALS — HEART RATE: 94 BPM | WEIGHT: 219 LBS | HEIGHT: 71 IN | BODY MASS INDEX: 30.66 KG/M2 | OXYGEN SATURATION: 99 %

## 2021-05-16 VITALS — WEIGHT: 216 LBS | OXYGEN SATURATION: 98 % | HEIGHT: 71 IN | HEART RATE: 104 BPM | BODY MASS INDEX: 30.24 KG/M2

## 2021-05-16 VITALS
HEART RATE: 97 BPM | WEIGHT: 188 LBS | DIASTOLIC BLOOD PRESSURE: 78 MMHG | OXYGEN SATURATION: 96 % | RESPIRATION RATE: 18 BRPM | SYSTOLIC BLOOD PRESSURE: 162 MMHG | TEMPERATURE: 98.2 F | BODY MASS INDEX: 26.32 KG/M2 | HEIGHT: 71 IN

## 2021-05-28 VITALS
OXYGEN SATURATION: 99 % | RESPIRATION RATE: 10 BRPM | DIASTOLIC BLOOD PRESSURE: 78 MMHG | HEIGHT: 71 IN | TEMPERATURE: 98 F | WEIGHT: 160 LBS | BODY MASS INDEX: 22.4 KG/M2 | SYSTOLIC BLOOD PRESSURE: 158 MMHG | HEART RATE: 87 BPM

## 2021-05-28 NOTE — PROGRESS NOTES
"Patient: BI MEDINA     Acct: [id number]     Report: #[id number]  UNIT #: [id number]     : 1965    Encounter Date:2019  PRIMARY CARE:   ***Signed***  --------------------------------------------------------------------------------------------------------------------  DATE: 3/21/19      Primary Care Provider:  JULIO C WELSH      Referring Provider:  JULIO C WELSH      Reason For Consult      NP Consult- Anemia            History of Present Illness      53-year-old white male was referred because of low blood counts.  According to     the wife patient will have neck surgery and needed to have good blood counts.      She claims that he did not have any iron by mouth because he gets sick from it.            For the past 6 weeks patient has not been eating well and has lost 50 pounds.      Patient comes has no appetite; he does not know when his food is chewed up en    ough for him to swallow.  Everything that he eats has in his throat.  Patient     claims that all of this worsened during last summer.            Past Medical/Surgical History             Hypertension             Diabetes Mellitus             No Heart Disease             No Blood Clots             No Cancer             Lung Disease (COPD)             No Kidney Disease             No Other            High Cholesterol, GERD, Mini Stroke- 2018      Surgeries: (R) Knee ; Neck ; (R) Shoulder , (R) Foot amp 2018     \"broken neck\"            Pyschiatric History      Depression, Anxiety, Panic Attacks, Bipolar; No Other            Social History      Social History:  Tobacco Use (2 ppd 40 years); No Alcohol Use, No Recreational     Drug use, No Other            Family History      Hypertension (Mother, Father), Diabetes Mellitus (Mother), Heart Disease     (Father); No Blood Clots; Cancer (Sister); No Lung Disease, No Kidney Disease,     No Other            Allergies      Coded Allergies:             STRAWBERRY " (Verified  Allergy, Severe, THROAT SWELLS, CAN'T BREATHE,     12/13/17)           CIMETIDINE (Verified  Allergy, Unknown, RASH, 12/13/17)           RED DYE (Verified  Allergy, Unknown, RASH, NAUSEA, FACE BLOTCHES, 12/13/17)           SULFA (SULFONAMIDE ANTIBIOTICS) (Verified  Allergy, Unknown, SICK, PASSES     OUT, ITCHES, 12/13/17)           CODEINE (Verified  Adverse Reaction, Unknown, SWEAT, HOT, SICK, 12/13/17)            Medications      Medications    Last Reconciled on 3/21/19 12:40 by JEREMY YOUNGBLOOD MD      FLUoxetine HCl (FLUoxetine HCl) 20 Mg Capsule      20 MG PO HS, CAP         Reported         3/21/19       Ibuprofen (Ibuprofen) 400 Mg Tablet      400 MG PO Q6H for PAIN, #100 TAB 0 Refills         Reported         3/21/19       Hydrocodone/Acetaminophen 5/325 MG (Hydrocodone/Acetaminophen 5/325 MG) 1 Each     Tablet      1 TAB PO Q4-6H PRN for PAIN, #30 TAB         Prov: VONDA YORK         12/14/17       Albuterol (Proair HFA) 8.5 Gm Inh      1 PUFFS INH RTQ4H, #1 INH 0 Refills         Reported         12/12/17       Pregabalin (Lyrica*) 150 Mg Cap      150 MG PO TID, #90 CAP         Reported         12/12/17       Insulin Degludec (Tresiba Flextouch U-100) 100 Unit/1 Ml Insuln.pen      20 UNITS SUBQ QDAY for 30 Days, #1 INSULN.PEN         Reported         12/12/17       Lisinopril* (Lisinopril*) 10 Mg Tablet      10 MG PO QDAY, #30 TAB 0 Refills         Reported         3/20/15      Current Medications      Current Medications Reviewed 3/21/19            Review of Systems      Abnormal as noted below; all other systems have been reviewed and are negative.      General:  Anxiety, Fatigue Scale: (10), Pain Scale: (10)      HEENT:  No Dysphagia, No Hearing Changes      Respiratory:  Cough (dry); No Shortness of Air      Cardiovascular:  No Chest Pain, No Pedal Edema      Gastrointestinal:  Nausea, Vomiting; No Dysphagia; Constipation, Appetite Poor     (poor)      Genitourinary:  No Nocturia       Musculoskeletal:  No Joint Effusions; Aches, Pains ((R) Side)      Endocrine:  No Heat Intolerance      Hematologic:  No Bleeding, No Bruising      Allergic/Immunologic:  No Hives      Psychological:  No Anxiety, No Depression      Neurological:  No Headaches; Weakness, Numbness ((R) Side)      Skin:  No Rash, No Open Wounds      Vitals:             Height 5 ft 11 in / 180.34 cm           Weight 160 lbs 0 oz / 72.840965 kg           BSA 1.92 m2           BMI 22.3 kg/m2           Temperature 98.0 F / 36.67 C           Pulse 87           Respirations 10           Blood Pressure 158/78           Pulse Oximetry 99%            Exam      Constitutional:  No acute distress, Conversant      Eyes:  Anicteric sclerae, Palpebral Conjunctivae (Pink), ROBBIN      HENT:  Oropharynx clear; No Erythema; Buccal mucosae (Pink)      Neck:  Supple; No Full Range of Motion      Lungs:  Clear to Ausculation, Normal Respiratory Effort, Rhonchi (Occasional),     Other (Diminished breath sounds)      Cardiovascular:  RRR; No Murmurs; Normal PMI; No Peripheral Edema      Abdomen:  Soft, NABS; No Tenderness      Skin:  Other (No dermatosis)      Extremities:  Other (Right amputation, on a wheelchair)      Psychiatric:  Appropriate affect, Intact judgement, AAO x 3      Lymphatic:  No Neck            Lab Results      CBC done on February 21, 2019 showed hemoglobin 13.5, hematocrit 40.3 normal     white count, normal platelet count, differential      CMP showed BUN of 5, creatinine 2.45, GFR 29.      Iron 53 total iron binding capacity 232      Percent saturation 23, transferrin 162, ferritin 468      B12 and folic acid normal      Thyroid function test normal            Impression/Problem List      Normochromic normocytic anemia, multifactorial-iron deficiency with any chronic     kidney disease stage III.  Since the patient has poor intake for the past 6     months need to rule out other nutritional deficiencies.      Diabetes mellitus  uncontrolled      Possible chronic obstructive airway disease      Hypertension      Chronic kidney disease stage III probably from diabetes mellitus and     hypertension      Cervical fracture      Iron p.o. intolerant      Depression      Anxiety disorder      Status post right foot amputation            Plan      Anemia of chronic kidney disease - patient would be a candidate for MANI however     his hemoglobin is over 10 g that is not needed at this time       Mild iron deficiency will give IV iron as needed      Chronic kidney disease patient is to see a nephrologist      Thank you very much for allowing me to participate in the care of your patient.     I will keep you posted on the progress of his workup.            Patient Education:        How to Quit Smoking            PREVENTION      2 or More Falls Past Year?:  No      Fall Past Year with Injury?:  No      Chart initiated by      FRANKLIN Umaña MA                 Disclaimer: Converted document may not contain table formatting or lab diagrams. Please see [employer] for the authenticated document.

## 2021-07-02 ENCOUNTER — APPOINTMENT (OUTPATIENT)
Dept: GENERAL RADIOLOGY | Facility: HOSPITAL | Age: 56
End: 2021-07-02

## 2021-07-02 ENCOUNTER — HOSPITAL ENCOUNTER (OUTPATIENT)
Facility: HOSPITAL | Age: 56
Setting detail: OBSERVATION
LOS: 1 days | Discharge: HOME | End: 2021-07-03
Attending: EMERGENCY MEDICINE | Admitting: INTERNAL MEDICINE

## 2021-07-02 DIAGNOSIS — N18.6 STAGE 5 CHRONIC KIDNEY DISEASE ON CHRONIC DIALYSIS (HCC): ICD-10-CM

## 2021-07-02 DIAGNOSIS — R13.10 DYSPHAGIA, UNSPECIFIED TYPE: ICD-10-CM

## 2021-07-02 DIAGNOSIS — I50.9 HEART FAILURE, UNSPECIFIED HF CHRONICITY, UNSPECIFIED HEART FAILURE TYPE (HCC): ICD-10-CM

## 2021-07-02 DIAGNOSIS — Z99.2 STAGE 5 CHRONIC KIDNEY DISEASE ON CHRONIC DIALYSIS (HCC): ICD-10-CM

## 2021-07-02 DIAGNOSIS — R77.8 ELEVATED TROPONIN: Primary | ICD-10-CM

## 2021-07-02 PROBLEM — R06.00 DYSPNEA: Status: ACTIVE | Noted: 2021-07-02

## 2021-07-02 LAB
ALBUMIN SERPL-MCNC: 3.7 G/DL (ref 3.5–5.2)
ALBUMIN/GLOB SERPL: 1.4 G/DL
ALP SERPL-CCNC: 101 U/L (ref 39–117)
ALT SERPL W P-5'-P-CCNC: 10 U/L (ref 1–41)
ANION GAP SERPL CALCULATED.3IONS-SCNC: 10.7 MMOL/L (ref 5–15)
ANION GAP SERPL CALCULATED.3IONS-SCNC: 12.9 MMOL/L (ref 5–15)
AST SERPL-CCNC: 12 U/L (ref 1–40)
BASOPHILS # BLD AUTO: 0.02 10*3/MM3 (ref 0–0.2)
BASOPHILS # BLD AUTO: 0.03 10*3/MM3 (ref 0–0.2)
BASOPHILS NFR BLD AUTO: 0.2 % (ref 0–1.5)
BASOPHILS NFR BLD AUTO: 0.3 % (ref 0–1.5)
BILIRUB SERPL-MCNC: 0.2 MG/DL (ref 0–1.2)
BUN SERPL-MCNC: 21 MG/DL (ref 6–20)
BUN SERPL-MCNC: 25 MG/DL (ref 6–20)
BUN/CREAT SERPL: 4.4 (ref 7–25)
BUN/CREAT SERPL: 4.7 (ref 7–25)
CALCIUM SPEC-SCNC: 8.3 MG/DL (ref 8.6–10.5)
CALCIUM SPEC-SCNC: 8.5 MG/DL (ref 8.6–10.5)
CHLORIDE SERPL-SCNC: 100 MMOL/L (ref 98–107)
CHLORIDE SERPL-SCNC: 101 MMOL/L (ref 98–107)
CK MB SERPL-CCNC: 3.66 NG/ML
CK SERPL-CCNC: 48 U/L (ref 20–200)
CO2 SERPL-SCNC: 22.1 MMOL/L (ref 22–29)
CO2 SERPL-SCNC: 24.3 MMOL/L (ref 22–29)
CREAT SERPL-MCNC: 4.76 MG/DL (ref 0.76–1.27)
CREAT SERPL-MCNC: 5.3 MG/DL (ref 0.76–1.27)
DEPRECATED RDW RBC AUTO: 45.9 FL (ref 37–54)
DEPRECATED RDW RBC AUTO: 46.6 FL (ref 37–54)
EOSINOPHIL # BLD AUTO: 0.02 10*3/MM3 (ref 0–0.4)
EOSINOPHIL # BLD AUTO: 0.02 10*3/MM3 (ref 0–0.4)
EOSINOPHIL NFR BLD AUTO: 0.2 % (ref 0.3–6.2)
EOSINOPHIL NFR BLD AUTO: 0.2 % (ref 0.3–6.2)
ERYTHROCYTE [DISTWIDTH] IN BLOOD BY AUTOMATED COUNT: 13 % (ref 12.3–15.4)
ERYTHROCYTE [DISTWIDTH] IN BLOOD BY AUTOMATED COUNT: 13.1 % (ref 12.3–15.4)
GFR SERPL CREATININE-BSD FRML MDRD: 11 ML/MIN/1.73
GFR SERPL CREATININE-BSD FRML MDRD: 13 ML/MIN/1.73
GFR SERPL CREATININE-BSD FRML MDRD: ABNORMAL ML/MIN/{1.73_M2}
GFR SERPL CREATININE-BSD FRML MDRD: ABNORMAL ML/MIN/{1.73_M2}
GLOBULIN UR ELPH-MCNC: 2.7 GM/DL
GLUCOSE SERPL-MCNC: 197 MG/DL (ref 65–99)
GLUCOSE SERPL-MCNC: 266 MG/DL (ref 65–99)
HCT VFR BLD AUTO: 35.9 % (ref 37.5–51)
HCT VFR BLD AUTO: 36.9 % (ref 37.5–51)
HGB BLD-MCNC: 11.6 G/DL (ref 13–17.7)
HGB BLD-MCNC: 11.7 G/DL (ref 13–17.7)
HOLD SPECIMEN: NORMAL
HOLD SPECIMEN: NORMAL
IMM GRANULOCYTES # BLD AUTO: 0.04 10*3/MM3 (ref 0–0.05)
IMM GRANULOCYTES # BLD AUTO: 0.04 10*3/MM3 (ref 0–0.05)
IMM GRANULOCYTES NFR BLD AUTO: 0.4 % (ref 0–0.5)
IMM GRANULOCYTES NFR BLD AUTO: 0.4 % (ref 0–0.5)
LYMPHOCYTES # BLD AUTO: 1.09 10*3/MM3 (ref 0.7–3.1)
LYMPHOCYTES # BLD AUTO: 1.44 10*3/MM3 (ref 0.7–3.1)
LYMPHOCYTES NFR BLD AUTO: 10.4 % (ref 19.6–45.3)
LYMPHOCYTES NFR BLD AUTO: 14.6 % (ref 19.6–45.3)
MCH RBC QN AUTO: 30.2 PG (ref 26.6–33)
MCH RBC QN AUTO: 31.2 PG (ref 26.6–33)
MCHC RBC AUTO-ENTMCNC: 31.7 G/DL (ref 31.5–35.7)
MCHC RBC AUTO-ENTMCNC: 32.3 G/DL (ref 31.5–35.7)
MCV RBC AUTO: 95.3 FL (ref 79–97)
MCV RBC AUTO: 96.5 FL (ref 79–97)
MONOCYTES # BLD AUTO: 0.55 10*3/MM3 (ref 0.1–0.9)
MONOCYTES # BLD AUTO: 0.67 10*3/MM3 (ref 0.1–0.9)
MONOCYTES NFR BLD AUTO: 5.6 % (ref 5–12)
MONOCYTES NFR BLD AUTO: 6.4 % (ref 5–12)
NEUTROPHILS NFR BLD AUTO: 7.78 10*3/MM3 (ref 1.7–7)
NEUTROPHILS NFR BLD AUTO: 79 % (ref 42.7–76)
NEUTROPHILS NFR BLD AUTO: 8.61 10*3/MM3 (ref 1.7–7)
NEUTROPHILS NFR BLD AUTO: 82.3 % (ref 42.7–76)
NRBC BLD AUTO-RTO: 0 /100 WBC (ref 0–0.2)
NRBC BLD AUTO-RTO: 0 /100 WBC (ref 0–0.2)
NT-PROBNP SERPL-MCNC: ABNORMAL PG/ML (ref 0–900)
PLATELET # BLD AUTO: 188 10*3/MM3 (ref 140–450)
PLATELET # BLD AUTO: 239 10*3/MM3 (ref 140–450)
PMV BLD AUTO: 9.3 FL (ref 6–12)
PMV BLD AUTO: 9.8 FL (ref 6–12)
POTASSIUM SERPL-SCNC: 4 MMOL/L (ref 3.5–5.2)
POTASSIUM SERPL-SCNC: 4.2 MMOL/L (ref 3.5–5.2)
PROT SERPL-MCNC: 6.4 G/DL (ref 6–8.5)
RBC # BLD AUTO: 3.72 10*6/MM3 (ref 4.14–5.8)
RBC # BLD AUTO: 3.87 10*6/MM3 (ref 4.14–5.8)
SODIUM SERPL-SCNC: 135 MMOL/L (ref 136–145)
SODIUM SERPL-SCNC: 136 MMOL/L (ref 136–145)
TROPONIN T SERPL-MCNC: 0.2 NG/ML (ref 0–0.03)
TROPONIN T SERPL-MCNC: 0.21 NG/ML (ref 0–0.03)
WBC # BLD AUTO: 10.46 10*3/MM3 (ref 3.4–10.8)
WBC # BLD AUTO: 9.85 10*3/MM3 (ref 3.4–10.8)
WHOLE BLOOD HOLD SPECIMEN: NORMAL

## 2021-07-02 PROCEDURE — 93005 ELECTROCARDIOGRAM TRACING: CPT | Performed by: EMERGENCY MEDICINE

## 2021-07-02 PROCEDURE — 71045 X-RAY EXAM CHEST 1 VIEW: CPT

## 2021-07-02 PROCEDURE — 93010 ELECTROCARDIOGRAM REPORT: CPT | Performed by: SPECIALIST

## 2021-07-02 PROCEDURE — 85025 COMPLETE CBC W/AUTO DIFF WBC: CPT | Performed by: EMERGENCY MEDICINE

## 2021-07-02 PROCEDURE — 83880 ASSAY OF NATRIURETIC PEPTIDE: CPT | Performed by: EMERGENCY MEDICINE

## 2021-07-02 PROCEDURE — 93005 ELECTROCARDIOGRAM TRACING: CPT

## 2021-07-02 PROCEDURE — 85025 COMPLETE CBC W/AUTO DIFF WBC: CPT | Performed by: INTERNAL MEDICINE

## 2021-07-02 PROCEDURE — 84484 ASSAY OF TROPONIN QUANT: CPT | Performed by: INTERNAL MEDICINE

## 2021-07-02 PROCEDURE — 99284 EMERGENCY DEPT VISIT MOD MDM: CPT

## 2021-07-02 PROCEDURE — 82553 CREATINE MB FRACTION: CPT | Performed by: INTERNAL MEDICINE

## 2021-07-02 PROCEDURE — 80053 COMPREHEN METABOLIC PANEL: CPT | Performed by: INTERNAL MEDICINE

## 2021-07-02 PROCEDURE — 82550 ASSAY OF CK (CPK): CPT | Performed by: INTERNAL MEDICINE

## 2021-07-02 PROCEDURE — 84484 ASSAY OF TROPONIN QUANT: CPT

## 2021-07-02 RX ORDER — HYDROCODONE BITARTRATE AND ACETAMINOPHEN 10; 325 MG/1; MG/1
TABLET ORAL
COMMUNITY
End: 2021-07-02

## 2021-07-02 RX ORDER — FAMOTIDINE 40 MG/1
TABLET, FILM COATED ORAL
COMMUNITY
End: 2021-07-02

## 2021-07-02 RX ORDER — ALBUTEROL SULFATE 90 UG/1
AEROSOL, METERED RESPIRATORY (INHALATION)
COMMUNITY
Start: 2021-01-13

## 2021-07-02 RX ORDER — OMEPRAZOLE 20 MG/1
CAPSULE, DELAYED RELEASE ORAL
COMMUNITY
End: 2021-07-02

## 2021-07-02 RX ORDER — FLUOXETINE HYDROCHLORIDE 20 MG/1
CAPSULE ORAL
COMMUNITY
End: 2021-07-02

## 2021-07-02 RX ORDER — ASPIRIN 81 MG/1
324 TABLET, CHEWABLE ORAL ONCE
Status: COMPLETED | OUTPATIENT
Start: 2021-07-02 | End: 2021-07-02

## 2021-07-02 RX ORDER — SODIUM CHLORIDE 0.9 % (FLUSH) 0.9 %
10 SYRINGE (ML) INJECTION AS NEEDED
Status: DISCONTINUED | OUTPATIENT
Start: 2021-07-02 | End: 2021-07-03 | Stop reason: HOSPADM

## 2021-07-02 RX ADMIN — ASPIRIN 324 MG: 81 TABLET, CHEWABLE ORAL at 20:04

## 2021-07-03 ENCOUNTER — APPOINTMENT (OUTPATIENT)
Dept: CARDIOLOGY | Facility: HOSPITAL | Age: 56
End: 2021-07-03

## 2021-07-03 VITALS
WEIGHT: 149.47 LBS | DIASTOLIC BLOOD PRESSURE: 68 MMHG | OXYGEN SATURATION: 97 % | RESPIRATION RATE: 16 BRPM | HEIGHT: 71 IN | TEMPERATURE: 98.4 F | HEART RATE: 103 BPM | SYSTOLIC BLOOD PRESSURE: 166 MMHG | BODY MASS INDEX: 20.93 KG/M2

## 2021-07-03 PROBLEM — N18.6 ESRD (END STAGE RENAL DISEASE): Status: RESOLVED | Noted: 2021-07-03 | Resolved: 2021-07-03

## 2021-07-03 PROBLEM — I50.9 CHF (CONGESTIVE HEART FAILURE) (HCC): Chronic | Status: ACTIVE | Noted: 2021-07-03

## 2021-07-03 PROBLEM — R06.00 DYSPNEA: Status: RESOLVED | Noted: 2021-07-02 | Resolved: 2021-07-03

## 2021-07-03 PROBLEM — R79.89 ELEVATED TROPONIN: Status: RESOLVED | Noted: 2021-07-03 | Resolved: 2021-07-03

## 2021-07-03 PROBLEM — E11.9 DM (DIABETES MELLITUS): Status: ACTIVE | Noted: 2021-07-03

## 2021-07-03 PROBLEM — R79.89 ELEVATED TROPONIN: Status: ACTIVE | Noted: 2021-07-03

## 2021-07-03 PROBLEM — R77.8 ELEVATED TROPONIN: Status: ACTIVE | Noted: 2021-07-03

## 2021-07-03 PROBLEM — R77.8 ELEVATED TROPONIN: Status: RESOLVED | Noted: 2021-07-03 | Resolved: 2021-07-03

## 2021-07-03 PROBLEM — E11.9 DM (DIABETES MELLITUS) (HCC): Status: RESOLVED | Noted: 2021-07-03 | Resolved: 2021-07-03

## 2021-07-03 PROBLEM — I10 HTN (HYPERTENSION): Status: ACTIVE | Noted: 2021-07-03

## 2021-07-03 PROBLEM — N18.6 ESRD (END STAGE RENAL DISEASE) (HCC): Status: ACTIVE | Noted: 2021-07-03

## 2021-07-03 LAB
DEPRECATED RDW RBC AUTO: 46.4 FL (ref 37–54)
ERYTHROCYTE [DISTWIDTH] IN BLOOD BY AUTOMATED COUNT: 13 % (ref 12.3–15.4)
GLUCOSE BLDC GLUCOMTR-MCNC: 142 MG/DL (ref 70–130)
GLUCOSE BLDC GLUCOMTR-MCNC: 204 MG/DL (ref 70–130)
HCT VFR BLD AUTO: 33 % (ref 37.5–51)
HGB BLD-MCNC: 10.7 G/DL (ref 13–17.7)
MCH RBC QN AUTO: 31.3 PG (ref 26.6–33)
MCHC RBC AUTO-ENTMCNC: 32.4 G/DL (ref 31.5–35.7)
MCV RBC AUTO: 96.5 FL (ref 79–97)
PLATELET # BLD AUTO: 218 10*3/MM3 (ref 140–450)
PMV BLD AUTO: 10.2 FL (ref 6–12)
RBC # BLD AUTO: 3.42 10*6/MM3 (ref 4.14–5.8)
WBC # BLD AUTO: 10.4 10*3/MM3 (ref 3.4–10.8)

## 2021-07-03 PROCEDURE — 25010000002 HEPARIN (PORCINE) PER 1000 UNITS: Performed by: INTERNAL MEDICINE

## 2021-07-03 PROCEDURE — 85027 COMPLETE CBC AUTOMATED: CPT | Performed by: INTERNAL MEDICINE

## 2021-07-03 PROCEDURE — 96372 THER/PROPH/DIAG INJ SC/IM: CPT

## 2021-07-03 PROCEDURE — G0257 UNSCHED DIALYSIS ESRD PT HOS: HCPCS

## 2021-07-03 PROCEDURE — 93005 ELECTROCARDIOGRAM TRACING: CPT

## 2021-07-03 PROCEDURE — G0378 HOSPITAL OBSERVATION PER HR: HCPCS

## 2021-07-03 PROCEDURE — 92610 EVALUATE SWALLOWING FUNCTION: CPT

## 2021-07-03 PROCEDURE — 82962 GLUCOSE BLOOD TEST: CPT

## 2021-07-03 PROCEDURE — 93306 TTE W/DOPPLER COMPLETE: CPT | Performed by: SPECIALIST

## 2021-07-03 PROCEDURE — 63710000001 PREDNISONE PER 5 MG: Performed by: INTERNAL MEDICINE

## 2021-07-03 PROCEDURE — 99222 1ST HOSP IP/OBS MODERATE 55: CPT | Performed by: SPECIALIST

## 2021-07-03 PROCEDURE — 96374 THER/PROPH/DIAG INJ IV PUSH: CPT

## 2021-07-03 PROCEDURE — 93306 TTE W/DOPPLER COMPLETE: CPT

## 2021-07-03 PROCEDURE — 94799 UNLISTED PULMONARY SVC/PX: CPT

## 2021-07-03 RX ORDER — SODIUM CHLORIDE 0.9 % (FLUSH) 0.9 %
10 SYRINGE (ML) INJECTION AS NEEDED
Status: DISCONTINUED | OUTPATIENT
Start: 2021-07-03 | End: 2021-07-03 | Stop reason: HOSPADM

## 2021-07-03 RX ORDER — CHOLECALCIFEROL (VITAMIN D3) 125 MCG
5 CAPSULE ORAL NIGHTLY PRN
Status: DISCONTINUED | OUTPATIENT
Start: 2021-07-03 | End: 2021-07-03 | Stop reason: HOSPADM

## 2021-07-03 RX ORDER — HEPARIN SODIUM 5000 [USP'U]/ML
5000 INJECTION, SOLUTION INTRAVENOUS; SUBCUTANEOUS EVERY 8 HOURS SCHEDULED
Status: DISCONTINUED | OUTPATIENT
Start: 2021-07-03 | End: 2021-07-03 | Stop reason: HOSPADM

## 2021-07-03 RX ORDER — ALBUTEROL SULFATE 2.5 MG/3ML
2.5 SOLUTION RESPIRATORY (INHALATION) EVERY 6 HOURS PRN
Status: DISCONTINUED | OUTPATIENT
Start: 2021-07-03 | End: 2021-07-03 | Stop reason: HOSPADM

## 2021-07-03 RX ORDER — HEPARIN SODIUM 1000 [USP'U]/ML
3900 INJECTION, SOLUTION INTRAVENOUS; SUBCUTANEOUS AS NEEDED
Status: DISCONTINUED | OUTPATIENT
Start: 2021-07-03 | End: 2021-07-03 | Stop reason: HOSPADM

## 2021-07-03 RX ORDER — FAMOTIDINE 10 MG/ML
20 INJECTION, SOLUTION INTRAVENOUS EVERY 12 HOURS SCHEDULED
Status: DISCONTINUED | OUTPATIENT
Start: 2021-07-03 | End: 2021-07-03

## 2021-07-03 RX ORDER — FAMOTIDINE 20 MG/1
40 TABLET, FILM COATED ORAL DAILY
Status: DISCONTINUED | OUTPATIENT
Start: 2021-07-03 | End: 2021-07-03

## 2021-07-03 RX ORDER — NALOXONE HCL 0.4 MG/ML
0.4 VIAL (ML) INJECTION
Status: DISCONTINUED | OUTPATIENT
Start: 2021-07-03 | End: 2021-07-03 | Stop reason: HOSPADM

## 2021-07-03 RX ORDER — METOLAZONE 5 MG/1
5 TABLET ORAL DAILY
Qty: 60 TABLET | Refills: 3 | Status: SHIPPED | OUTPATIENT
Start: 2021-07-03

## 2021-07-03 RX ORDER — NITROGLYCERIN 0.4 MG/1
0.4 TABLET SUBLINGUAL
Status: DISCONTINUED | OUTPATIENT
Start: 2021-07-03 | End: 2021-07-03 | Stop reason: HOSPADM

## 2021-07-03 RX ORDER — FUROSEMIDE 80 MG
80 TABLET ORAL 2 TIMES DAILY
Qty: 180 TABLET | Refills: 3 | Status: SHIPPED | OUTPATIENT
Start: 2021-07-03

## 2021-07-03 RX ORDER — CARVEDILOL 6.25 MG/1
6.25 TABLET ORAL 2 TIMES DAILY WITH MEALS
Qty: 60 TABLET | Refills: 3 | Status: SHIPPED | OUTPATIENT
Start: 2021-07-03

## 2021-07-03 RX ORDER — ONDANSETRON 2 MG/ML
4 INJECTION INTRAMUSCULAR; INTRAVENOUS EVERY 6 HOURS PRN
Status: DISCONTINUED | OUTPATIENT
Start: 2021-07-03 | End: 2021-07-03 | Stop reason: HOSPADM

## 2021-07-03 RX ORDER — HYDROCODONE BITARTRATE AND ACETAMINOPHEN 7.5; 325 MG/1; MG/1
2 TABLET ORAL EVERY 4 HOURS PRN
Status: DISCONTINUED | OUTPATIENT
Start: 2021-07-03 | End: 2021-07-03 | Stop reason: HOSPADM

## 2021-07-03 RX ORDER — NICOTINE POLACRILEX 4 MG
15 LOZENGE BUCCAL
Status: DISCONTINUED | OUTPATIENT
Start: 2021-07-03 | End: 2021-07-03 | Stop reason: HOSPADM

## 2021-07-03 RX ORDER — SODIUM CHLORIDE 0.9 % (FLUSH) 0.9 %
10 SYRINGE (ML) INJECTION EVERY 12 HOURS SCHEDULED
Status: DISCONTINUED | OUTPATIENT
Start: 2021-07-03 | End: 2021-07-03 | Stop reason: HOSPADM

## 2021-07-03 RX ORDER — LORAZEPAM 0.5 MG/1
0.5 TABLET ORAL EVERY 8 HOURS PRN
Status: DISCONTINUED | OUTPATIENT
Start: 2021-07-03 | End: 2021-07-03 | Stop reason: HOSPADM

## 2021-07-03 RX ORDER — FAMOTIDINE 10 MG/ML
20 INJECTION, SOLUTION INTRAVENOUS DAILY
Status: DISCONTINUED | OUTPATIENT
Start: 2021-07-04 | End: 2021-07-03 | Stop reason: HOSPADM

## 2021-07-03 RX ORDER — DEXTROSE MONOHYDRATE 100 MG/ML
25 INJECTION, SOLUTION INTRAVENOUS
Status: DISCONTINUED | OUTPATIENT
Start: 2021-07-03 | End: 2021-07-03 | Stop reason: HOSPADM

## 2021-07-03 RX ORDER — PREDNISONE 1 MG/1
5 TABLET ORAL DAILY
Status: DISCONTINUED | OUTPATIENT
Start: 2021-07-03 | End: 2021-07-03 | Stop reason: HOSPADM

## 2021-07-03 RX ORDER — ALBUMIN (HUMAN) 12.5 G/50ML
12.5 SOLUTION INTRAVENOUS AS NEEDED
Status: DISCONTINUED | OUTPATIENT
Start: 2021-07-04 | End: 2021-07-03 | Stop reason: HOSPADM

## 2021-07-03 RX ADMIN — FAMOTIDINE 20 MG: 10 INJECTION INTRAVENOUS at 08:38

## 2021-07-03 RX ADMIN — SODIUM CHLORIDE, PRESERVATIVE FREE 10 ML: 5 INJECTION INTRAVENOUS at 08:39

## 2021-07-03 RX ADMIN — SODIUM CHLORIDE, PRESERVATIVE FREE 10 ML: 5 INJECTION INTRAVENOUS at 02:07

## 2021-07-03 RX ADMIN — HEPARIN SODIUM 5000 UNITS: 5000 INJECTION INTRAVENOUS; SUBCUTANEOUS at 05:14

## 2021-07-03 RX ADMIN — PREDNISONE 5 MG: 5 TABLET ORAL at 08:38

## 2021-07-03 NOTE — PLAN OF CARE
Goal Outcome Evaluation:              Outcome Summary: pt stable, to discharge after diaylsis today. [facility] contacted to do meds to bed per pt request.

## 2021-07-03 NOTE — SIGNIFICANT NOTE
07/03/21 1205   Provider Notification   Reason for Communication Patient request   Provider Name DR. PEDERSON   Notification Route Phone call   Response Other (Comment)  (SEE LINKED NURSE NOTE)   DR. PEDERSON NOTIFIED THAT PT HAS CONCERNS ABOUT GETTING NEW PRESCRIPTIONS FILLED DUE TO PHARMACY LIMITED HOURS DUE TO HOLIDAY WEEKEND. [facility] [place] [facility] CONTACTED AND VERIFIED THAT IF DISCHARGE ORDERS ARE PLACED AND MEDICATIONS SUBMITTED THEY CAN DELIVER VIA MEDS TO BEDS BUT STOP DELIVERY AT 1330. MD STATED THAT HE WILL PLACE ORDERS, PT NOTIFIED OF CURRENT PLAN. PT ALSO TOLD THAT IF MEDS TO BEDS IS UNABLE TO DELIVER MEDICATIONS, RN CONTACTED [facility] IN [place] TO VERIFY THAT THEY ARE STILL OPEN 24/7 AND HAVE NO CHANGED HOURS DUE TO THE HOLIDAY WEEKEND. LR,RN

## 2021-07-03 NOTE — PLAN OF CARE
Goal Outcome Evaluation:  Plan of Care Reviewed With: patient           Outcome Summary: Patient exhibits swallow function appropriate for regular solids and thin liquids.  Does not demonstrate any need for skilled speech pathology services at this time.

## 2021-07-03 NOTE — CONSULTS
[facility]   Cardiology Consult Note    Patient Name: Michael Ponce  : 1965  MRN: [id number]  Primary Care Physician:  Souleymane Sprague MD  Referring Physician: Souleymane Sprague, *  Date of admission: 2021    Subjective   Subjective     Reason for Consult/ Chief Complaint: Shortness of breath    HPI:  Michael Ponce is a 55 y.o. male with history of end-stage renal disease on dialysis presents with increasing shortness of breath.  This is going on for last several weeks.  Denies any chest pain.  Troponins are slightly elevated probably secondary to his end-stage renal disease.    Review of Systems:   Constitutional no fever,  no weight loss   Skin no rash   Otolaryngeal no difficulty swallowing   Cardiovascular See HPI   Pulmonary no cough, no sputum production   Gastrointestinal no constipation, no diarrhea   Genitourinary no dysuria, no hematuria   Hematologic no easy bruisability, no abnormal bleeding   Musculoskeletal no muscle pain   Neurologic no dizziness, no falls         Personal History       Past Medical/Surgical History:   Past Medical History:   Diagnosis Date   • Anxiety    • Arthritis    • Asthma    • Carotid artery disease (CMS/HCC)    • CHF (congestive heart failure) (CMS/HCC)    • Chronic pain syndrome    • COPD (chronic obstructive pulmonary disease) (CMS/HCC)    • Depression    • Diabetes mellitus (CMS/HCC)    • Elevated cholesterol    • GERD (gastroesophageal reflux disease)    • History of transfusion    • Hypercholesterolemia    • Hyperlipidemia    • Hypertension    • Neuropathy    • Stroke (CMS/HCC)      Past Surgical History:   Procedure Laterality Date   • AMPUTATION FOOT Right     parial   • BACK SURGERY     • CENTRAL VENOUS CATHETER TUNNELED INSERTION DOUBLE LUMEN Right    • DIALYSIS FISTULA CREATION Right    • DIALYSIS FISTULA CREATION Right    • KNEE SURGERY Right    • LUNG SURGERY      took fluid off the lung   • NECK SURGERY Bilateral    •  REPLACEMENT TOTAL KNEE Right    • SHOULDER ARTHROSCOPY     • TOTAL SHOULDER ARTHROPLASTY Right 07/12/2016    with biceps tenodesis         Family History: No family History of CAD.    Social History:  reports that he has been smoking. He has been smoking about 0.50 packs per day. He has never used smokeless tobacco. He reports previous drug use. He reports that he does not drink alcohol.    Medications:  Medications Prior to Admission   Medication Sig Dispense Refill Last Dose   • albuterol sulfate  (90 Base) MCG/ACT inhaler INHALE 2 PUFFS BY MOUTH FOUR TIMES DAILY AS NEEDED   7/2/2021 at Unknown time   • PredniSONE 5 MG tablet therapy pack dosepak Take 1 each by mouth Take As Directed. Take as directed on package instructions. LAST DOSE WAS DAY 4.   7/2/2021 at Unknown time     Current medications:  famotidine, 40 mg, Oral, Daily  heparin (porcine), 5,000 Units, Subcutaneous, Q8H  insulin lispro, 0-7 Units, Subcutaneous, TID AC  predniSONE, 5 mg, Oral, Daily  sodium chloride, 10 mL, Intravenous, Q12H      Current IV drips:       Allergies:  Allergies   Allergen Reactions   • Ascorbate Shortness Of Breath   • Contrast Dye Anaphylaxis   • Red Dye Itching and Shortness Of Breath   • Strawberry Anaphylaxis   • Sulfa Antibiotics Anaphylaxis   • Morphine Itching   • Penicillins Unknown - Low Severity       Objective    Objective     Vitals:   Temp:  [98 °F (36.7 °C)-98.5 °F (36.9 °C)] 98.1 °F (36.7 °C)  Heart Rate:  [] 88  Resp:  [16-20] 16  BP: (162-189)/(72-94) 162/72      Physical Exam:   Constitutional: Awake, alert, No acute distress    Eyes: PERRLA, sclerae anicteric, no conjunctival injection   HENT: NCAT, mucous membranes moist   Neck: Supple, no thyromegaly, no lymphadenopathy, trachea midline   Respiratory: Clear to auscultation bilaterally, nonlabored respirations    Cardiovascular: RRR, no murmurs, rubs, or gallops, palpable pedal pulses bilaterally   Gastrointestinal: Positive bowel sounds,  soft, nontender, nondistended   Musculoskeletal: No bilateral ankle edema, no clubbing or cyanosis to extremities   Psychiatric: Appropriate affect, cooperative   Neurologic: Oriented x 3, strength symmetric in all extremities, Cranial Nerves grossly intact to confrontation, speech clear   Skin: No rashes.    Result Review    Result Review:  I have personally reviewed the results from the time of this admission to 7/3/2021 07:33 EDT and agree with these findings:  [x]  Laboratory  [x]  EKG/Telemetry   [x]  Cardiology/Vascular   []  Pathology  [x]  Old records  [x]  Medications  Basic Metabolic Panel    Sodium Sodium   Date Value Ref Range Status   07/02/2021 136 136 - 145 mmol/L Final   07/02/2021 135 (L) 136 - 145 mmol/L Final      Potassium Potassium   Date Value Ref Range Status   07/02/2021 4.2 3.5 - 5.2 mmol/L Final   07/02/2021 4.0 3.5 - 5.2 mmol/L Final      Chloride Chloride   Date Value Ref Range Status   07/02/2021 101 98 - 107 mmol/L Final   07/02/2021 100 98 - 107 mmol/L Final      Bicarbonate No results found for: PLASMABICARB   BUN BUN   Date Value Ref Range Status   07/02/2021 25 (H) 6 - 20 mg/dL Final   07/02/2021 21 (H) 6 - 20 mg/dL Final      Creatinine Creatinine   Date Value Ref Range Status   07/02/2021 5.30 (H) 0.76 - 1.27 mg/dL Final   07/02/2021 4.76 (H) 0.76 - 1.27 mg/dL Final      Calcium Calcium   Date Value Ref Range Status   07/02/2021 8.5 (L) 8.6 - 10.5 mg/dL Final   07/02/2021 8.3 (L) 8.6 - 10.5 mg/dL Final      Glucose      No components found for: GLUCOSE.*     No results found for this or any previous visit.     Lab Results   Component Value Date    PROBNP 53,874.0 (H) 07/02/2021          EKG shows sinus rhythm with no acute changes.  Telemetry reviewed shows sinus rhythm    Assessment / Plan     Impression/plan  Chronic kidney disease stage IV/end-stage renal disease on dialysis: Continue current management as per nephrology  Chronic diastolic heart failure: Echocardiogram to  evaluate left ventricular systolic function.  Low-salt diet fluid restriction advised.  Continue p.o. Lasix and dialysis.  Slightly increased troponins probably secondary to chronic kidney disease: Lexiscan stress test to rule out any significant ischemia as an outpatient.          Electronically signed by Sriram Marroquin MD, 07/03/21, 7:33 AM EDT.

## 2021-07-03 NOTE — ED PROVIDER NOTES
Subjective   Dyspnea.  Exertional.  Gradual onset and constant.  Most notable over the past week or so.  Patient has history of hemodialysis.  He has been supplementing with Lasix that he has been obtaining from a family member and he feels like this helps his symptoms.  He denies any wheezing or improvement after medications for COPD.  Denies chest pain.  Denies fevers or chills.  Denies cough.          Review of Systems   All other systems reviewed and are negative.      Past Medical History:   Diagnosis Date   • Anxiety    • Arthritis    • Asthma    • Carotid artery disease (CMS/[facility])    • CHF (congestive heart failure) (CMS/HCC)    • Chronic pain syndrome    • COPD (chronic obstructive pulmonary disease) (CMS/HCC)    • Depression    • Diabetes mellitus (CMS/HCC)    • Elevated cholesterol    • GERD (gastroesophageal reflux disease)    • History of transfusion    • Hypercholesterolemia    • Hyperlipidemia    • Hypertension    • Neuropathy    • Stroke (CMS/[facility])        Allergies   Allergen Reactions   • Ascorbate Shortness Of Breath   • Contrast Dye Anaphylaxis   • Red Dye Itching and Shortness Of Breath   • Strawberry Anaphylaxis   • Sulfa Antibiotics Anaphylaxis   • Morphine Itching   • Penicillins Unknown - Low Severity       Past Surgical History:   Procedure Laterality Date   • AMPUTATION FOOT Right     parial   • BACK SURGERY     • CENTRAL VENOUS CATHETER TUNNELED INSERTION DOUBLE LUMEN Right    • DIALYSIS FISTULA CREATION Right    • DIALYSIS FISTULA CREATION Right    • KNEE SURGERY Right    • LUNG SURGERY      took fluid off the lung   • NECK SURGERY Bilateral    • REPLACEMENT TOTAL KNEE Right    • SHOULDER ARTHROSCOPY     • TOTAL SHOULDER ARTHROPLASTY Right 07/12/2016    with biceps tenodesis       Family History   Problem Relation Age of Onset   • Heart disease Mother    • Heart attack Mother    • Heart attack Father    • Heart disease Father    • Heart failure Neg Hx    • Hyperlipidemia Neg Hx    •  Hypertension Neg Hx        Social History     Socioeconomic History   • Marital status:      Spouse name: Not on file   • Number of children: Not on file   • Years of education: Not on file   • Highest education level: Not on file   Tobacco Use   • Smoking status: Current Every Day Smoker     Packs/day: 0.50   • Smokeless tobacco: Never Used   Vaping Use   • Vaping Use: Never used   Substance and Sexual Activity   • Alcohol use: Never     Comment: ocassionally   • Drug use: Not Currently   • Sexual activity: Yes           Objective   Physical Exam  Vitals and nursing note reviewed.   Constitutional:       General: He is not in acute distress.  HENT:      Head: Normocephalic.   Neck:      Vascular: No JVD.   Cardiovascular:      Rate and Rhythm: Normal rate and regular rhythm.   Pulmonary:      Effort: Pulmonary effort is normal.      Breath sounds: Normal breath sounds.   Abdominal:      Palpations: Abdomen is soft.      Tenderness: There is no abdominal tenderness.   Musculoskeletal:      Right lower leg: No tenderness. No edema.      Left lower leg: No tenderness. No edema.   Skin:     General: Skin is warm and dry.   Neurological:      Mental Status: He is alert and oriented to person, place, and time.   Psychiatric:      Comments: Anxious         Procedures           ED Course                                           MDM  55-year-old male dialysis patient presents with shortness of breath.  He states that over the past several weeks on and off he has been experiencing increasing shortness of breath.  He thinks it has gotten better by taking Lasix and he has been compliant with dialysis.  He recently saw his PCP who felt that he may be suffering from COPD but the treatment for that did not seem to help his symptoms.  Here in the department today he has an elevated troponin at 0.2 with no old lab values to compare to.  He also has an elevated BNP which may be falsely positive secondary to his status on  dialysis.  Nonetheless given the concern he will be placed in the hospital for observation and serial troponins and cardiology evaluation.    On reevaluation the patient was in stable condition.  We discussed his elevated troponin and my concern that his dyspnea could represent acute coronary syndrome.  We discussed the treatment and he has agreed to be admitted, he is agreed to take aspirin, but he would not agree to initiate even temporary anticoagulation.  Final diagnoses:   Elevated troponin   Heart failure, unspecified HF chronicity, unspecified heart failure type (CMS/[facility])   Stage 5 chronic kidney disease on chronic dialysis (CMS/[facility])       ED Disposition  ED Disposition     ED Disposition Condition Comment    Decision to Admit  Level of Care: Telemetry [5]   Diagnosis: Dyspnea [102242]   Admitting Physician: FELTON PEDERSON [[id number]]   Attending Physician: FELTON PEDERSON [[id number]]   Certification: I Certify That Inpatient Hospital Services Are Medically Necessary For Greater Than 2 Midnights            No follow-up provider specified.       Medication List      No changes were made to your prescriptions during this visit.          Rian Ernandez DO  07/03/21 0128       Rian Ernandez DO  07/03/21 0235

## 2021-07-03 NOTE — THERAPY EVALUATION
Acute Care - Speech Language Pathology   Swallow Initial Evaluation  [facility]     Patient Name: Michael Ponce  : 1965  MRN: [id number]  Today's Date: 7/3/2021               Admit Date: 2021    Visit Dx:     ICD-10-CM ICD-9-CM   1. Elevated troponin  R77.8 790.6   2. Heart failure, unspecified HF chronicity, unspecified heart failure type (CMS/[facility])  I50.9 428.9   3. Stage 5 chronic kidney disease on chronic dialysis (CMS/[facility])  N18.6 585.6    Z99.2 V45.11   4. Dysphagia, unspecified type  R13.10 787.20     Patient Active Problem List   Diagnosis   • Dyspnea     Past Medical History:   Diagnosis Date   • Anxiety    • Arthritis    • Asthma    • Carotid artery disease (CMS/[facility])    • CHF (congestive heart failure) (CMS/[facility])    • Chronic pain syndrome    • COPD (chronic obstructive pulmonary disease) (CMS/[facility])    • Depression    • Diabetes mellitus (CMS/[facility])    • Elevated cholesterol    • GERD (gastroesophageal reflux disease)    • History of transfusion    • Hypercholesterolemia    • Hyperlipidemia    • Hypertension    • Neuropathy    • Stroke (CMS/[facility])      Past Surgical History:   Procedure Laterality Date   • AMPUTATION FOOT Right     parial   • BACK SURGERY     • CENTRAL VENOUS CATHETER TUNNELED INSERTION DOUBLE LUMEN Right    • DIALYSIS FISTULA CREATION Right    • DIALYSIS FISTULA CREATION Right    • KNEE SURGERY Right    • LUNG SURGERY      took fluid off the lung   • NECK SURGERY Bilateral    • REPLACEMENT TOTAL KNEE Right    • SHOULDER ARTHROSCOPY     • TOTAL SHOULDER ARTHROPLASTY Right 2016    with biceps tenodesis        SWALLOW EVALUATION (last 72 hours)      SLP Adult Swallow Evaluation     Row Name 21 1102                   Rehab Evaluation    Document Type  evaluation  -SN        Subjective Information  no complaints  -SN        Patient Observations  alert;cooperative  -SN        Patient Effort  excellent  -SN           General Information    Prior Level of Function-Swallowing  no  diet consistency restrictions;regular textures;thin liquids  -SN        Plans/Goals Discussed with  patient  -SN        Barriers to Rehab  none identified  -SN        Patient's Goals for Discharge  return home  -SN           Oral Motor Structure and Function    Dentition Assessment  upper dentures/partial in place  -SN        Secretion Management  WNL/WFL  -SN        Mucosal Quality  moist, healthy  -SN        Gag Response  WFL  -SN        Volitional Swallow  WFL  -SN        Volitional Cough  WFL;non-productive  -SN           Oral Musculature and Cranial Nerve Assessment    Oral Motor General Assessment  WFL  -SN        Oral Labial or Buccal Impairment, Detail, Cranial Nerve VII (Facial):  CN7: Sensory Impairment  -SN        Oral Motor, Comment  Patient with left-sided labial and buccal numbness.  Range of motion however 5/5.  -SN           General Eating/Swallowing Observations    Respiratory Support Currently in Use  room air  -SN        Eating/Swallowing Skills  self-fed  -SN        Positioning During Eating  upright 90 degree  -SN        Utensils Used  spoon;cup;straw  -SN           Respiratory    Respiratory Status  wheezing, stridor;room air  -SN           Clinical Swallow Eval    Oral Prep Phase  WFL  -SN        Oral Transit  WFL  -SN        Oral Residue  WFL  -SN        Pharyngeal Phase  no overt signs/symptoms of pharyngeal impairment  -SN        Esophageal Phase  unremarkable  -SN        Clinical Swallow Evaluation Summary  No overt signs or symptoms of aspiration observed at bedside, silent aspiration cannot be ruled out.  -SN           Recommendations    Therapy Frequency (Swallow)  evaluation only  -SN        SLP Diet Recommendation  regular textures;thin liquids  -SN        Recommended Precautions and Strategies  upright posture during/after eating  -SN        SLP Rec. for Method of Medication Administration  meds whole  -SN          User Key  (r) = Recorded By, (t) = Taken By, (c) = Cosigned By     Initials Name Effective Dates    Bridget Nunez SLP 03/31/21 -         Patient exhibiting level 7 of 7 on functional communication measures for swallowing, indicating a 0% limitation in function.  EDUCATION  The patient has been educated in the following areas:   Dysphagia (Swallowing Impairment).    SLP Recommendation and Plan     SLP Diet Recommendation: regular textures, thin liquids  Recommended Precautions and Strategies: upright posture during/after eating  SLP Rec. for Method of Medication Administration: meds whole              Anticipated Discharge Disposition (SLP): home, home with assist     Therapy Frequency (Swallow): evaluation only                            Plan of Care Reviewed With: patient  Outcome Summary: Patient exhibits swallow function appropriate for regular solids and thin liquids.  Does not demonstrate any need for skilled speech pathology services at this time.           Time Calculation:   Time Calculation- SLP     Row Name 07/03/21 1102             Time Calculation- SLP    SLP Start Time  1000  -SN      SLP Stop Time  1100  -SN      SLP Time Calculation (min)  60 min  -SN      SLP Received On  07/03/21  -SN         Untimed Charges    68942-PU Eval Oral Pharyng Swallow Minutes  60  -SN         Total Minutes    Untimed Charges Total Minutes  60  -SN       Total Minutes  60  -SN        User Key  (r) = Recorded By, (t) = Taken By, (c) = Cosigned By    Initials Name Provider Type    Bridget Nunez SLP Speech and Language Pathologist          Therapy Charges for Today     Code Description Service Date Service Provider Modifiers Qty    [id number] HC ST EVAL ORAL PHARYNG SWALLOW 4 7/3/2021 Bridget Scott SLP GN 1               DRE Basurto  7/3/2021

## 2021-07-03 NOTE — H&P
[facility]   HISTORY AND PHYSICAL    Date: July 3, 2021  Patient Name: Michael Ponce  Patient YOB: 1965      Chief Complaint:   Chief Complaint   Patient presents with   • Shortness of Breath       History:     History of Present Illness:  Michael Ponce is a 55 y.o. male with history of ESRD on hemodialysis MWF, chronic diastolic heart failure sent to the hospital with worsening shortness of breath last several days.  He remains compliant dialysis sessions.  He has been using as needed Lasix with some improvement.  He still makes good amount of urine per report.  Patient denies any active chest pain.  Patient was noted to have elevated troponin at the time of presentation he was admitted for further evaluation and management.    Current Medications:  Current Facility-Administered Medications   Medication Dose Route Frequency Provider Last Rate Last Admin   • [START ON 7/4/2021] albumin human 25 % IV SOLN 12.5 g  12.5 g Intravenous PRN Jann Robles MD       • albuterol (PROVENTIL) nebulizer solution 0.083% 2.5 mg/3mL  2.5 mg Nebulization Q6H PRN Jann Robles MD       • dextrose (GLUTOSE) oral gel 15 g  15 g Oral Q15 Min PRN Jann Robles MD       • dextrose 10 % infusion  25 g Intravenous Q15 Min PRN Jann Robles MD       • [START ON 7/4/2021] famotidine (PEPCID) injection 20 mg  20 mg Intravenous Daily Malcolm Carroll       • glucagon (human recombinant) (GLUCAGEN DIAGNOSTIC) injection 1 mg  1 mg Subcutaneous Q15 Min PRN Jann Robles MD       • heparin (porcine) 5000 UNIT/ML injection 5,000 Units  5,000 Units Subcutaneous Q8H Jann Robles MD   5,000 Units at 07/03/21 0514   • HYDROcodone-acetaminophen (NORCO) 7.5-325 MG per tablet 2 tablet  2 tablet Oral Q4H PRN Jann Robles MD       • HYDROmorphone (DILAUDID) injection 0.5 mg  0.5 mg Intravenous Q2H PRN Jann Robles MD        And   • naloxone (NARCAN) injection 0.4 mg  0.4 mg Intravenous Q5  Min PRN Jann Robles MD       • insulin lispro (humaLOG) injection 0-7 Units  0-7 Units Subcutaneous TID AC Jann Robles MD       • LORazepam (ATIVAN) tablet 0.5 mg  0.5 mg Oral Q8H PRN Jann Robles MD       • melatonin tablet 5 mg  5 mg Oral Nightly PRN Jann Robles MD       • nitroglycerin (NITROSTAT) SL tablet 0.4 mg  0.4 mg Sublingual Q5 Min PRN Jann Robles MD       • ondansetron (ZOFRAN) injection 4 mg  4 mg Intravenous Q6H PRN Jann Robles MD       • predniSONE (DELTASONE) tablet 5 mg  5 mg Oral Daily Jann Robles MD   5 mg at 07/03/21 0838   • sodium chloride 0.9 % flush 10 mL  10 mL Intravenous PRN Jann Robles MD       • sodium chloride 0.9 % flush 10 mL  10 mL Intravenous Q12H Jann Robles MD   10 mL at 07/03/21 0839   • sodium chloride 0.9 % flush 10 mL  10 mL Intravenous PRN Jann Robles MD           Allergies:  Allergies   Allergen Reactions   • Ascorbate Shortness Of Breath   • Contrast Dye Anaphylaxis   • Red Dye Itching and Shortness Of Breath   • Strawberry Anaphylaxis   • Sulfa Antibiotics Anaphylaxis   • Morphine Itching   • Penicillins Unknown - Low Severity       Past Medical History:  • Anxiety     • Arthritis     • Asthma     • Carotid artery disease (CMS/[facility])     • CHF (congestive heart failure) (CMS/[facility])     • Chronic pain syndrome     • COPD (chronic obstructive pulmonary disease) (CMS/[facility])     • Depression     • Diabetes mellitus (CMS/[facility])     • Elevated cholesterol     • GERD (gastroesophageal reflux disease)     • History of transfusion     • Hypercholesterolemia     • Hyperlipidemia     • Hypertension     • Neuropathy     • Stroke (CMS/[facility])               Past Surgical History:  • AMPUTATION FOOT Right       parial   • BACK SURGERY       • CENTRAL VENOUS CATHETER TUNNELED INSERTION DOUBLE LUMEN Right     • DIALYSIS FISTULA CREATION Right     • DIALYSIS FISTULA CREATION Right     • KNEE SURGERY Right     • LUNG SURGERY          "took fluid off the lung   • NECK SURGERY Bilateral     • REPLACEMENT TOTAL KNEE Right     • SHOULDER ARTHROSCOPY       • TOTAL SHOULDER ARTHROPLASTY Right 07/12/2016     with biceps tenodesis                Social History:  Social History     Socioeconomic History   • Marital status:      Spouse name: Not on file   • Number of children: Not on file   • Years of education: Not on file   • Highest education level: Not on file   Tobacco Use   • Smoking status: Current Every Day Smoker     Packs/day: 0.50   • Smokeless tobacco: Never Used   Vaping Use   • Vaping Use: Never used   Substance and Sexual Activity   • Alcohol use: Never     Comment: ocassionally   • Drug use: Not Currently   • Sexual activity: Yes        Family History:  Family History   Problem Relation Age of Onset   • Heart disease Mother    • Heart attack Mother    • Heart attack Father    • Heart disease Father    • Heart failure Neg Hx    • Hyperlipidemia Neg Hx    • Hypertension Neg Hx        Review of Systems:  Constitutional:Denies fever or chills or weight changes    Skin: Denies rash  Eyes: No visual changes  ENT: Denies sore throat, congestion, hearing changes  Respiratory: Shortness of breath   cardiovascular: Denies chest pain, palpitations  Gastrointestinal: Denies N/V/D, abdominal pain, constipation  Genitourinary: Denies dysuria, hematuria  Musculoskeletal: Denies leg swelling, leg pain, or joint pain  Neurologic: Denies headache or seizures or weakness  Psychiatric: Denies anxiety or depression  Hematologic: Denies easy bleeding/bruising    Examination:     /68 (BP Location: Left arm, Patient Position: Sitting)   Pulse 103   Temp 98.4 °F (36.9 °C) (Oral)   Resp 16   Ht 180.3 cm (71\")   Wt 67.8 kg (149 lb 7.6 oz)   SpO2 97%   BMI 20.85 kg/m²   CONSTITUTIONAL: no acute distress  NEUROLOGIC: awake, alert, oriented, following commands  HEAD:NC/AT  EYES: EOMI, PERRLA  ENT: clear oropharynx  CARDIOVASCULAR: RRR  PULMONARY: " Basilar Rales   GASTROINTESTINAL: +bs, soft, non-tender, non-distended  MUSCULOSKELETAL: moving all extremities, no edema  SKIN: warm, dry    Data:     Laboratory Testing:  Reviewed in [facility]    Radiology Studies:  Reviewed in Epic    Impression:     Michael Ponce is a 55 y.o. male with PMH as listed below notable for ESRD on hemodialysis, diastolic CHF admitted for worsening dyspnea on exertion.    Patient Active Problem List   Diagnosis   • Dyspnea   • Elevated troponin   • ESRD (end stage renal disease) (CMS/[facility])   • CHF (congestive heart failure) (CMS/[facility])   • HTN (hypertension)   • DM (diabetes mellitus) (CMS/[facility])         Plan:     Start Lasix 80 mg p.o. twice daily and metolazone milligrams daily p.o.  Advised on diet compliance with low-salt diet.  Perform additional hemodialysis today for ultrafiltration.  Will aim to obtain a new lower target weight  Troponins trended flat  No active Chest pain  Cardiology evaluated - planning on echocardiogram and outpatient stress test  Hypertension-start Coreg 6.25 bid    Electronically signed by Jann Roblse MD, 07/03/21, 12:16 PM EDT.

## 2021-07-03 NOTE — PLAN OF CARE
Goal Outcome Evaluation:         PT ADMITTED TO FLOOR. PT NOT WILLING TO PARTICIPATE IN SOME ORDERED ACTIVITIES. PT STATES HE WILL PROBABLY LEAVE IN THE MORNING. PT STATES HE IS NOT COMPLIANT WITH HIS AT HOME MEDICAL CARE

## 2021-07-03 NOTE — CONSULTS
"Nutrition Services    Patient Name: Michael Ponce  YOB: 1965  MRN: [id number]  Admission date: 7/2/2021      CLINICAL NUTRITION ASSESSMENT       Reason for Assessment   MST score 2+, Physician consult     H&P:    Past Medical History:   Diagnosis Date   • Anxiety    • Arthritis    • Asthma    • Carotid artery disease (CMS/HCC)    • CHF (congestive heart failure) (CMS/[facility])    • Chronic pain syndrome    • COPD (chronic obstructive pulmonary disease) (CMS/[facility])    • Depression    • Diabetes mellitus (CMS/[facility])    • Elevated cholesterol    • GERD (gastroesophageal reflux disease)    • History of transfusion    • Hypercholesterolemia    • Hyperlipidemia    • Hypertension    • Neuropathy    • Stroke (CMS/[facility])        Past Surgical History:   Procedure Laterality Date   • AMPUTATION FOOT Right     parial   • BACK SURGERY     • CENTRAL VENOUS CATHETER TUNNELED INSERTION DOUBLE LUMEN Right    • DIALYSIS FISTULA CREATION Right    • DIALYSIS FISTULA CREATION Right    • KNEE SURGERY Right    • LUNG SURGERY      took fluid off the lung   • NECK SURGERY Bilateral    • REPLACEMENT TOTAL KNEE Right    • SHOULDER ARTHROSCOPY     • TOTAL SHOULDER ARTHROPLASTY Right 07/12/2016    with biceps tenodesis          Nutrition/Diet History         Narrative     Pt reported he is not concerned about his recent wt loss. Stated it hasn't been anything worth writing home about. Chart reviews shows 13% wt loss over 1 year. Did report mild difficulty chewing.  Will continue to monitor intake and follow per protocol.       Anthropometrics        Current Height, Weight Height: 180.3 cm (71\")  Weight: 67.8 kg (149 lb 7.6 oz) (07/02/21 2310)        Flowsheet Rows      First Filed Value   Admission Height  160 cm (63\") Documented at 07/02/2021 1814   Admission Weight  66.8 kg (147 lb 4.3 oz) Documented at 07/02/2021 1814             Weight Hx    Wt Readings from Last 30 Encounters:   07/02/21 2310 67.8 kg (149 lb 7.6 oz)   07/02/21 1814 " "66.8 kg (147 lb 4.3 oz)   03/09/21 0000 68.5 kg (151 lb 2 oz)   06/03/20 0000 78 kg (172 lb)   09/13/19 0000 75.3 kg (166 lb)   08/02/19 0000 73.9 kg (163 lb)   06/28/19 0000 73.9 kg (163 lb)   06/04/19 0000 72.6 kg (160 lb)   05/08/19 0000 74.4 kg (164 lb)   03/21/19 1120 72.6 kg (160 lb)   02/21/19 0000 74.8 kg (165 lb)   12/03/18 0000 85.1 kg (187 lb 9 oz)   11/27/18 0000 85.3 kg (188 lb)   10/11/18 0000 92.5 kg (204 lb)   07/27/18 0000 99.3 kg (219 lb)   06/27/18 0000 101 kg (223 lb)   05/25/18 0000 102 kg (225 lb)   05/04/18 0000 103 kg (226 lb)   04/13/18 0000 101 kg (223 lb)   03/15/18 0000 101 kg (222 lb)   02/22/18 0000 99.3 kg (219 lb)   01/23/18 0000 98 kg (216 lb)   01/09/18 0000 99.3 kg (219 lb)        BMI kg/m2 Body mass index is 20.85 kg/m².       Labs/Medications         Pertinent Labs Reviewed   Results from last 7 days   Lab Units 07/02/21  2238 07/02/21  1834   SODIUM mmol/L 136 135*   POTASSIUM mmol/L 4.2 4.0   CHLORIDE mmol/L 101 100   CO2 mmol/L 24.3 22.1   BUN mg/dL 25* 21*   CREATININE mg/dL 5.30* 4.76*   CALCIUM mg/dL 8.5* 8.3*   BILIRUBIN mg/dL  --  0.2   ALK PHOS U/L  --  101   ALT (SGPT) U/L  --  10   AST (SGOT) U/L  --  12   GLUCOSE mg/dL 266* 197*     Results from last 7 days   Lab Units 07/03/21  0446   HEMOGLOBIN g/dL 10.7*   HEMATOCRIT % 33.0*     Coronavirus (COVID-19)   Date Value Ref Range Status   03/26/2021 NOT DETECTED NA Final     Comment:     The SARS-CoV-2 assay is a real-time, RT-PCR test intended  for the qualitative detection of nucleic acid from the  SARS-CoV-2 in respiratory specimens from individuals,  testing performed at [facility].       Lab Results   Component Value Date    HGBA1C 6.2 (H) 05/08/2019         Pertinent Medications Reviewed     Estimated/Assessed Needs       Energy Requirements    Height for Calculation  Height: 180.3 cm (71\")   Weight for Calculation 67.8   Method for Estimation  25-30kcal/kg   EST Needs (kcal/day) 1695-2034kcal     "   Protein Requirements    Weight for Calculation 67.8   EST Protein Needs (g/kg) 1.0-1.2g/kg   EST Daily Needs (g/day) 68-81gPro       Fluid Requirements     Estimated Needs (mL/day) 1695ml     Current Nutrition Orders & Evaluation of Received Nutrient/Fluid Intake       Oral Nutrition     Current PO Diet Diet Regular; Cardiac     Nutrition Diagnosis         Nutrition Dx Problem 1 No nutrition diagnosis at this time       Nutrition Intervention        RD/Tech Action No nutrition intervention at this time       Monitor/Evaluation        Monitor I&O, PO intake, Weight     Electronically signed by:  Marley Bhakta RD  07/03/21 12:30 EDT

## 2021-07-04 ENCOUNTER — READMISSION MANAGEMENT (OUTPATIENT)
Dept: CALL CENTER | Facility: HOSPITAL | Age: 56
End: 2021-07-04

## 2021-07-04 ENCOUNTER — TRANSCRIBE ORDERS (OUTPATIENT)
Dept: CARDIOLOGY | Facility: HOSPITAL | Age: 56
End: 2021-07-04

## 2021-07-04 DIAGNOSIS — R07.9 CHEST PAIN, UNSPECIFIED TYPE: Primary | ICD-10-CM

## 2021-07-04 LAB
BH CV ECHO MEAS - AO ROOT DIAM: 3.4 CM
BH CV ECHO MEAS - EDV(MOD-SP2): 102 ML
BH CV ECHO MEAS - EDV(MOD-SP4): 102 ML
BH CV ECHO MEAS - EF(MOD-BP): 53 %
BH CV ECHO MEAS - ESV(MOD-SP2): 48.5 ML
BH CV ECHO MEAS - ESV(MOD-SP4): 44.6 ML
BH CV ECHO MEAS - IVSD: 1.2 CM
BH CV ECHO MEAS - LA DIMENSION(2D): 4.8 CM
BH CV ECHO MEAS - LAT PEAK E' VEL: 7.3 CM/SEC
BH CV ECHO MEAS - LVIDD: 5.2 CM
BH CV ECHO MEAS - LVIDS: 3.2 CM
BH CV ECHO MEAS - LVOT DIAM: 2 CM
BH CV ECHO MEAS - LVPWD: 1.1 CM
BH CV ECHO MEAS - MED PEAK E' VEL: 6.5 CM/SEC
BH CV ECHO MEAS - MV A MAX VEL: 81 CM/SEC
BH CV ECHO MEAS - MV DEC TIME: 190 MSEC
BH CV ECHO MEAS - MV E MAX VEL: 112 CM/SEC
BH CV ECHO MEAS - MV E/A: 1.4
BH CV ECHO MEAS - RAP SYSTOLE: 3 MMHG
BH CV ECHO MEAS - RVDD: 2.2 CM
BH CV ECHO MEAS - RVSP: 25 MMHG
BH CV ECHO MEAS - TR MAX PG: 22 MMHG
BH CV ECHO MEAS - TR MAX VEL: 234 CM/SEC
BH CV ECHO MEASUREMENTS AVERAGE E/E' RATIO: 16.23
IVRT: 85 MSEC
LEFT ATRIUM VOLUME INDEX: 53.1 ML/M2
MAXIMAL PREDICTED HEART RATE: 166 BPM
QT INTERVAL: 350 MS
QT INTERVAL: 395 MS
STRESS TARGET HR: 141 BPM

## 2021-07-04 NOTE — OUTREACH NOTE
Prep Survey      Responses   [facility] facility patient discharged from?  [facility]   Is LACE score < 7 ?  No   Emergency Room discharge w/ pulse ox?  No   Eligibility  Readm Mgmt   Discharge diagnosis  ESRD on hemodialysis, diastolic CHF admitted for worsening dyspnea on exertion,  elevated troponin   Does the patient have one of the following disease processes/diagnoses(primary or secondary)?  Other   Does the patient have Home health ordered?  No   Is there a DME ordered?  No   Prep survey completed?  Yes          Karely Flanagan RN

## 2021-07-06 ENCOUNTER — READMISSION MANAGEMENT (OUTPATIENT)
Dept: CALL CENTER | Facility: HOSPITAL | Age: 56
End: 2021-07-06

## 2021-07-06 NOTE — OUTREACH NOTE
Medical Week 1 Survey      Responses   [facility] patient discharged from?  [facility]   Does the patient have one of the following disease processes/diagnoses(primary or secondary)?  Other   Week 1 attempt successful?  Yes   Call start time  0916   Call end time  0933   Discharge diagnosis  ESRD on hemodialysis, diastolic CHF admitted for worsening dyspnea on exertion,  elevated troponin   Person spoke with today (if not patient) and relationship  Cristal Tran, Sister--emergency contact, POA   Meds reviewed with patient/caregiver?  Yes   Is the patient having any side effects they believe may be caused by any medication additions or changes?  No   Does the patient have all medications ordered at discharge?  Yes   Is the patient taking all medications as directed (includes completed medication regime)?  Yes   Comments regarding appointments  Provided number to [facility] as notes indicated need for OP stress testing.  Sister to call and schedule an appt with Dr. Michael.   Does the patient have a primary care provider?   Yes   Does the patient have an appointment with their PCP within 7 days of discharge?  No   What is preventing the patient from scheduling follow up appointments within 7 days of discharge?  Haven't had time   Nursing Interventions  Advised patient to make appointment   Has the patient kept scheduled appointments due by today?  No   What is preventing the patient from keeping their appointments?  -- [Sister communicated that patient missed HD yesterday as he didn't feel well which was unusual for him to miss a treatment]   Nursing Interventions  Educated on importance of keeping appointment   What is the Home health agency?   na   Has home health visited the patient within 72 hours of discharge?  N/A   Psychosocial issues?  No   Did the patient receive a copy of their discharge instructions?  Yes   Nursing interventions  Reviewed instructions with patient   What is the patient's perception of  their health status since discharge?  -- [Sister communicated that patient was not feeling well yesterday and relates to medication adjustments and normalization of BP.  Sister is a nurse who assists patient with medical and other needs as he recently lost his wife.  ]   Is the patient/caregiver able to teach back signs and symptoms related to disease process for when to call PCP?  Yes   Is the patient/caregiver able to teach back signs and symptoms related to disease process for when to call 911?  Yes   Is the patient/caregiver able to teach back the hierarchy of who to call/visit for symptoms/problems? PCP, Specialist, Home health nurse, Urgent Care, ED, 911  Yes   Week 1 call completed?  Yes          Kymberly Miranda RN

## 2021-07-13 ENCOUNTER — READMISSION MANAGEMENT (OUTPATIENT)
Dept: CALL CENTER | Facility: HOSPITAL | Age: 56
End: 2021-07-13

## 2021-07-13 NOTE — OUTREACH NOTE
Medical Week 2 Survey      Responses   [facility], [place], operated by [facility] patient discharged from?  [facility]   Does the patient have one of the following disease processes/diagnoses(primary or secondary)?  Other   Week 2 attempt successful?  Yes   Call start time  0731   Call end time  0734   Person spoke with today (if not patient) and relationship  Cristal Tran, Sister--emergency contact, POA   Meds reviewed with patient/caregiver?  Yes   Is the patient taking all medications as directed (includes completed medication regime)?  Yes   Has the patient kept scheduled appointments due by today?  Yes   What is the patient's perception of their health status since discharge?  Improving   Additional teach back comments  uses walker an dw/isabel SIS POA helps him   Week 2 Call Completed?  Yes   Wrap up additional comments  Per his sister, he is going to his Drs. no new issues, no questions, going to [facility] today          Lita Leonard RN

## 2021-07-20 ENCOUNTER — READMISSION MANAGEMENT (OUTPATIENT)
Dept: CALL CENTER | Facility: HOSPITAL | Age: 56
End: 2021-07-20

## 2021-07-20 NOTE — OUTREACH NOTE
Medical Week 3 Survey      Responses   [facility] patient discharged from?  [facility]   Does the patient have one of the following disease processes/diagnoses(primary or secondary)?  Other   Week 3 attempt successful?  No   Unsuccessful attempts  Attempt 1   Discharge diagnosis  ESRD on hemodialysis, diastolic CHF admitted for worsening dyspnea on exertion,  elevated troponin          Jazlyn Harvey RN

## 2021-07-21 ENCOUNTER — READMISSION MANAGEMENT (OUTPATIENT)
Dept: CALL CENTER | Facility: HOSPITAL | Age: 56
End: 2021-07-21

## 2021-07-21 NOTE — OUTREACH NOTE
Medical Week 3 Survey      Responses   [facility] patient discharged from?  [facility]   Does the patient have one of the following disease processes/diagnoses(primary or secondary)?  Other   Week 3 attempt successful?  Yes   Call start time  1521   Call end time  1526   Discharge diagnosis  ESRD on hemodialysis, diastolic CHF admitted for worsening dyspnea on exertion,  elevated troponin   Person spoke with today (if not patient) and relationship  Cristal Tran, --emergency contact, POA   Meds reviewed with patient/caregiver?  Yes   Is the patient taking all medications as directed (includes completed medication regime)?  Yes   Comments regarding appointments  Appt with cards is on 8/17/21   Comments regarding PCP  Pt has followed up with PCP since hospital d/c on 7/3/21   Has the patient kept scheduled appointments due by today?  Yes   What is the patient's perception of their health status since discharge?  Improving   If the patient is a current smoker, are they able to teach back resources for cessation?  Smoking cessation medications   Week 3 Call Completed?  Yes   Wrap up additional comments  Sister is thankful for all of the kindness from the [facility] nurses.           Katharine Mckinney RN

## 2021-07-30 ENCOUNTER — READMISSION MANAGEMENT (OUTPATIENT)
Dept: CALL CENTER | Facility: HOSPITAL | Age: 56
End: 2021-07-30

## 2021-07-30 NOTE — OUTREACH NOTE
Medical Week 4 Survey      Responses   [facility], [place], operated [facility] patient discharged from?  [facility]   Does the patient have one of the following disease processes/diagnoses(primary or secondary)?  Other   Week 4 attempt successful?  No          Disha Rock RN

## 2021-08-15 NOTE — PROGRESS NOTES
[facility]   Cardiology Consult Note    Patient Name: Michael Ponce  : 1965Subjective   Subjective     Reason for Consult/ Chief Complaint: Shortness of breath.    HPI:  Michael Ponce is a 56 y.o. male history of end-stage renal disease recently in the hospital with CHF.  Shortness of breath is improved.  Recently hospitalized CHF.  His troponins were slightly high.  Secondary to CHF.    Review of Systems:   Constitutional no fever,  no weight loss   Skin no rash   Otolaryngeal no difficulty swallowing   Cardiovascular See HPI   Pulmonary no cough, no sputum production   Gastrointestinal no constipation, no diarrhea   Genitourinary no dysuria, no hematuria   Hematologic no easy bruisability, no abnormal bleeding   Musculoskeletal no muscle pain   Neurologic no dizziness, no falls     Personal History     Past Medical History:  Past Medical History:   Diagnosis Date   • Anxiety    • Arthritis    • Asthma    • Carotid artery disease (CMS/[facility])    • CHF (congestive heart failure) (CMS/[facility])    • Chronic pain syndrome    • COPD (chronic obstructive pulmonary disease) (CMS/[facility])    • Depression    • Diabetes mellitus (CMS/[facility])    • Elevated cholesterol    • GERD (gastroesophageal reflux disease)    • History of transfusion    • Hypercholesterolemia    • Hyperlipidemia    • Hypertension    • Neuropathy    • Stroke (CMS/[facility])        Family History:   Family History   Problem Relation Age of Onset   • Heart disease Mother    • Heart attack Mother    • Heart attack Father    • Heart disease Father    • Heart failure Neg Hx    • Hyperlipidemia Neg Hx    • Hypertension Neg Hx        Social History:  reports that he has been smoking. He has been smoking about 0.50 packs per day. He has never used smokeless tobacco. He reports previous drug use. He reports that he does not drink alcohol.    Home Medications:  PredniSONE, albuterol sulfate HFA, carvedilol, furosemide, and metOLazone    Allergies:  Allergies    Allergen Reactions   • Ascorbate Shortness Of Breath   • Contrast Dye Anaphylaxis   • Red Dye Itching and Shortness Of Breath   • Strawberry Anaphylaxis   • Sulfa Antibiotics Anaphylaxis   • Morphine Itching   • Penicillins Unknown - Low Severity       Objective    Objective     Vitals:      There is no height or weight on file to calculate BMI.  Physical Exam:   Constitutional: Awake, alert, No acute distress    Eyes: PERRLA, sclerae anicteric, no conjunctival injection   HENT: NCAT, mucous membranes moist   Neck: Supple, no thyromegaly, no lymphadenopathy, trachea midline   Respiratory: Clear to auscultation bilaterally, nonlabored respirations    Cardiovascular: RRR, no murmurs or rubs. Palpable pedal pulses bilaterally   Gastrointestinal: Positive bowel sounds, soft, nontender, nondistended   Musculoskeletal: No bilateral ankle edema, no clubbing or cyanosis to extremities   Psychiatric: Appropriate affect, cooperative   Neurologic: Oriented x 3, strength symmetric in all extremities, Cranial Nerves grossly intact to confrontation, speech clear   Skin: No rashes     Result Review    Result Review:  I have personally reviewed the available results:  [x]  Laboratory  [x]  EKG/Telemetry   [x]  Cardiology/Vascular   [x] Medications  [x]  Old records      @EKG Reviewed shows sinus rhythm.    Procedures     Impression/Plan  1.  Chronic diastolic heart failure: Continue Lasix and metolazone.  Continue carvedilol.  Lexiscan sestamibi stress test to evaluate for any significant ischemia.  Reviewed his reports from the hospital.  Continue carvedilol.  Continue metolazone.  Continue Lasix.  2.  Chronic kidney disease stage IV/end-stage renal disease: On dialysis.  Managed by nephrologist.  3.  Moderate bilateral carotid stenosis: Managed by his vascular surgeon.

## 2021-08-17 ENCOUNTER — OFFICE VISIT (OUTPATIENT)
Dept: CARDIOLOGY | Facility: CLINIC | Age: 56
End: 2021-08-17

## 2021-08-17 ENCOUNTER — HOSPITAL ENCOUNTER (OUTPATIENT)
Facility: HOSPITAL | Age: 56
Setting detail: HOSPITAL OUTPATIENT SURGERY
End: 2021-08-17
Attending: SURGERY | Admitting: SURGERY

## 2021-08-17 ENCOUNTER — LAB (OUTPATIENT)
Dept: LAB | Facility: HOSPITAL | Age: 56
End: 2021-08-17

## 2021-08-17 VITALS
BODY MASS INDEX: 20.02 KG/M2 | HEIGHT: 71 IN | SYSTOLIC BLOOD PRESSURE: 124 MMHG | WEIGHT: 143 LBS | HEART RATE: 73 BPM | DIASTOLIC BLOOD PRESSURE: 50 MMHG

## 2021-08-17 DIAGNOSIS — R06.02 SHORTNESS OF BREATH: Primary | ICD-10-CM

## 2021-08-17 DIAGNOSIS — T82.591A: ICD-10-CM

## 2021-08-17 DIAGNOSIS — I25.10 CORONARY ARTERY DISEASE INVOLVING NATIVE CORONARY ARTERY OF NATIVE HEART WITHOUT ANGINA PECTORIS: ICD-10-CM

## 2021-08-17 DIAGNOSIS — I50.32 DIASTOLIC CHF, CHRONIC (HCC): ICD-10-CM

## 2021-08-17 DIAGNOSIS — T82.591A: Primary | ICD-10-CM

## 2021-08-17 PROCEDURE — U0005 INFEC AGEN DETEC AMPLI PROBE: HCPCS

## 2021-08-17 PROCEDURE — 99214 OFFICE O/P EST MOD 30 MIN: CPT | Performed by: SPECIALIST

## 2021-08-17 PROCEDURE — U0003 INFECTIOUS AGENT DETECTION BY NUCLEIC ACID (DNA OR RNA); SEVERE ACUTE RESPIRATORY SYNDROME CORONAVIRUS 2 (SARS-COV-2) (CORONAVIRUS DISEASE [COVID-19]), AMPLIFIED PROBE TECHNIQUE, MAKING USE OF HIGH THROUGHPUT TECHNOLOGIES AS DESCRIBED BY CMS-2020-01-R: HCPCS

## 2021-08-17 PROCEDURE — C9803 HOPD COVID-19 SPEC COLLECT: HCPCS

## 2021-08-17 RX ORDER — HYDROCODONE BITARTRATE AND ACETAMINOPHEN 10; 325 MG/1; MG/1
1 TABLET ORAL EVERY 6 HOURS PRN
COMMUNITY

## 2021-08-18 LAB — SARS-COV-2 RNA RESP QL NAA+PROBE: DETECTED

## 2021-08-19 ENCOUNTER — DOCUMENTATION (OUTPATIENT)
Dept: CARDIOLOGY | Facility: CLINIC | Age: 56
End: 2021-08-19

## 2021-08-19 NOTE — PROGRESS NOTES
Have made multiple attempts to reach patient at all listed phone numbers to let him know he test positive for COVID and will not be able to proceed with procedure for tomorrow with Dr Rivera. I called and spoke with Dr Rivera's office staff and they are going to work on getting in contact with the patient as well.

## 2021-08-25 ENCOUNTER — HOSPITAL ENCOUNTER (OUTPATIENT)
Facility: HOSPITAL | Age: 56
Setting detail: HOSPITAL OUTPATIENT SURGERY
End: 2021-08-25
Attending: SURGERY | Admitting: SURGERY

## 2021-08-25 DIAGNOSIS — T82.591A: ICD-10-CM

## 2021-08-25 DIAGNOSIS — T82.591A: Primary | ICD-10-CM

## 2021-08-31 DIAGNOSIS — T82.591A: Primary | ICD-10-CM

## 2021-09-17 ENCOUNTER — HOSPITAL ENCOUNTER (OUTPATIENT)
Facility: HOSPITAL | Age: 56
Setting detail: HOSPITAL OUTPATIENT SURGERY
Discharge: HOME | End: 2021-09-17
Attending: SURGERY | Admitting: SURGERY

## 2021-09-17 ENCOUNTER — TELEPHONE (OUTPATIENT)
Dept: VASCULAR SURGERY | Facility: HOSPITAL | Age: 56
End: 2021-09-17

## 2021-09-17 VITALS
HEIGHT: 71 IN | RESPIRATION RATE: 18 BRPM | SYSTOLIC BLOOD PRESSURE: 180 MMHG | OXYGEN SATURATION: 100 % | DIASTOLIC BLOOD PRESSURE: 64 MMHG | WEIGHT: 150.35 LBS | BODY MASS INDEX: 21.05 KG/M2 | TEMPERATURE: 97.7 F | HEART RATE: 81 BPM

## 2021-09-17 DIAGNOSIS — T82.591A: ICD-10-CM

## 2021-09-17 PROCEDURE — 25010000002 FENTANYL CITRATE (PF) 100 MCG/2ML SOLUTION: Performed by: SURGERY

## 2021-09-17 PROCEDURE — C1894 INTRO/SHEATH, NON-LASER: HCPCS | Performed by: SURGERY

## 2021-09-17 PROCEDURE — 36901 INTRO CATH DIALYSIS CIRCUIT: CPT | Performed by: SURGERY

## 2021-09-17 RX ORDER — FENTANYL CITRATE 50 UG/ML
INJECTION, SOLUTION INTRAMUSCULAR; INTRAVENOUS AS NEEDED
Status: DISCONTINUED | OUTPATIENT
Start: 2021-09-17 | End: 2021-09-17 | Stop reason: HOSPADM

## 2021-09-17 RX ORDER — LIDOCAINE HYDROCHLORIDE 20 MG/ML
INJECTION, SOLUTION INFILTRATION; PERINEURAL AS NEEDED
Status: DISCONTINUED | OUTPATIENT
Start: 2021-09-17 | End: 2021-09-17 | Stop reason: HOSPADM

## 2021-09-17 NOTE — H&P
[facility]   VASCULAR SURGERY HISTORY AND PHYSICAL    Patient Name: Michael Ponce  : 1965  MRN: [id number]  Primary Care Physician:  Kymberly Otto MD  Date of admission: 2021    Subjective   Subjective     Chief Complaint: Malfunctioning right arm AV fistula    History  56-year-old male who had a right radiocephalic fistula created several months ago.  He was being accessed well until recently.  The fistula apparently got infiltrated and they have been unable to access it.    Review of Systems   Constitutional: Negative.    HENT: Negative.    Eyes: Negative.    Respiratory: Negative.    Cardiovascular: Negative.    Gastrointestinal: Negative.    Endocrine: Negative.    Genitourinary: Negative.    Musculoskeletal: Negative.    Skin: Negative.    Allergic/Immunologic: Negative.    Neurological: Negative.    Hematological: Negative.    Psychiatric/Behavioral: Negative.         Personal History     Past Medical History:   Diagnosis Date   • Abnormal ECG    • Anxiety    • Arthritis    • Asthma    • Carotid artery disease (CMS/H[facility])    • CHF (congestive heart failure) (CMS/[facility])    • Chronic pain syndrome    • COPD (chronic obstructive pulmonary disease) (CMS/[facility])    • Depression    • Diabetes mellitus (CMS/H[facility])    • Elevated cholesterol    • ESRD (end stage renal disease) (CMS/H[facility])    • GERD (gastroesophageal reflux disease)    • History of transfusion    • Hypercholesterolemia    • Hyperlipidemia    • Hypertension    • Myocardial infarction (CMS/[facility])    • Neuropathy    • Stroke (CMS/[facility])        Past Surgical History:   Procedure Laterality Date   • AMPUTATION FOOT Right     parial   • BACK SURGERY     • CENTRAL VENOUS CATHETER TUNNELED INSERTION DOUBLE LUMEN Right    • DIALYSIS FISTULA CREATION Right    • DIALYSIS FISTULA CREATION Right    • KNEE SURGERY Right    • LUNG SURGERY      took fluid off the lung   • NECK SURGERY Bilateral    • REPLACEMENT TOTAL KNEE Right    • SHOULDER ARTHROSCOPY     •  TOTAL SHOULDER ARTHROPLASTY Right 07/12/2016    with biceps tenodesis       Family History: family history includes Heart attack in his father and mother; Heart disease in his father and mother. Otherwise pertinent FHx was reviewed and not pertinent to current issue.    Social History:  reports that he has been smoking. He has been smoking about 0.50 packs per day. He has never used smokeless tobacco. He reports previous drug use. He reports that he does not drink alcohol.    Home Medications:  HYDROcodone-acetaminophen, albuterol sulfate HFA, carvedilol, furosemide, and metOLazone    Allergies:  Allergies   Allergen Reactions   • Ascorbate Shortness Of Breath   • Contrast Dye Anaphylaxis   • Red Dye Itching and Shortness Of Breath   • Strawberry Anaphylaxis   • Sulfa Antibiotics Anaphylaxis   • Morphine Itching   • Penicillins Unknown - Low Severity       Objective    Objective       Physical Exam  Constitutional:       Appearance: Normal appearance.   HENT:      Head: Normocephalic and atraumatic.   Eyes:      Extraocular Movements: Extraocular movements intact.      Pupils: Pupils are equal, round, and reactive to light.   Cardiovascular:      Rate and Rhythm: Normal rate and regular rhythm.      Comments: Thrill in right radiocephalic fistula  Pulmonary:      Effort: Pulmonary effort is normal.      Breath sounds: Normal breath sounds.   Abdominal:      General: Abdomen is flat. Bowel sounds are normal.      Palpations: Abdomen is soft.   Musculoskeletal:      Cervical back: Normal range of motion and neck supple.   Skin:     General: Skin is warm and dry.   Neurological:      General: No focal deficit present.      Mental Status: He is alert and oriented to person, place, and time.         Result Review    Result Review:  I have personally reviewed the results from the time of this admission to 9/17/2021 10:56 EDT and agree with these findings:  []  Laboratory  []  Microbiology  []  Radiology  []  EKG/Telemetry    []  Cardiology/Vascular   []  Pathology  []  Old records  []  Other:  Most notable findings include:     Assessment / Plan     Brief Patient Summary:  Michael Ponce is a 56 y.o. male who has a malfunctioning right arm radiocephalic fistula    Plan:   Plan for fistulogram with possible intervention.  Risks and benefits explained.      Electronically signed by Gigi Rivera MD, MD, 09/17/21, 10:56 AM EDT.

## 2021-09-17 NOTE — PROCEDURES
VASCULAR INTERVENTION REPORT         Patient Name:  Michael Ponce  YOB: 1965    Date of Surgery:  9/17/2021         Pre-OP Diagnosis     Malfunctioning right arm AV fistula    Post-OP Diagnosis     Malfunctioning right arm AV fistula    Procedure     Right upper extremity fistulogram  11 minutes of monitored conscious sedation    Staff     Gigi Rievra MD      Anesthesia     Local with sedation      Findings     Normal fistula with no stenosis    BLOOD LOSS: 0 ML  COMPLICATIONS:  none  SPECIMEN:  NONE      Description of Procedure     The proximal portion of the fistula was accessed with a micropuncture needle and a micropuncture sheath inserted.  An angiogram was performed revealed widely patent cephalic vein in the forearm.  The outflow in the upper arm.  The basilic and cephalic veins were widely patent.  The fistula was compressed in contrast with reflux to the proximal anastomosis.  There was no evidence of stenosis proximal in the fistula.  The sheath was then withdrawn and pressure held with no bleeding or hematoma.  The patient tolerated the procedure well.            Date: 9/17/2021  Time: 10:58 EDT

## 2021-09-17 NOTE — PRE-SEDATION DOCUMENTATION
Sedation Plan    ASA 4     Mallampati class: III.    Risks, benefits, and alternatives discussed with patient.

## 2021-10-08 ENCOUNTER — TRANSCRIBE ORDERS (OUTPATIENT)
Dept: ADMINISTRATIVE | Facility: HOSPITAL | Age: 56
End: 2021-10-08

## 2021-10-11 ENCOUNTER — TRANSCRIBE ORDERS (OUTPATIENT)
Dept: ADMINISTRATIVE | Facility: HOSPITAL | Age: 56
End: 2021-10-11

## 2021-10-11 DIAGNOSIS — N18.6 ESRD (END STAGE RENAL DISEASE) (HCC): Primary | ICD-10-CM

## 2021-10-18 DIAGNOSIS — N18.6 END STAGE RENAL DISEASE ON DIALYSIS (HCC): ICD-10-CM

## 2021-10-18 DIAGNOSIS — T82.590A DIALYSIS AV FISTULA MALFUNCTION, INITIAL ENCOUNTER (HCC): Primary | ICD-10-CM

## 2021-10-18 DIAGNOSIS — Z99.2 END STAGE RENAL DISEASE ON DIALYSIS (HCC): ICD-10-CM

## 2021-10-26 ENCOUNTER — HOSPITAL ENCOUNTER (OUTPATIENT)
Facility: HOSPITAL | Age: 56
Setting detail: HOSPITAL OUTPATIENT SURGERY
Discharge: HOME | End: 2021-10-26
Attending: SURGERY | Admitting: SURGERY

## 2021-10-26 DIAGNOSIS — T82.590A DIALYSIS AV FISTULA MALFUNCTION, INITIAL ENCOUNTER (HCC): ICD-10-CM

## 2021-10-26 DIAGNOSIS — N18.6 END STAGE RENAL DISEASE ON DIALYSIS (HCC): ICD-10-CM

## 2021-10-26 DIAGNOSIS — Z99.2 END STAGE RENAL DISEASE ON DIALYSIS (HCC): ICD-10-CM

## 2021-11-08 ENCOUNTER — TRANSCRIBE ORDERS (OUTPATIENT)
Dept: ADMINISTRATIVE | Facility: HOSPITAL | Age: 56
End: 2021-11-08

## 2021-11-08 DIAGNOSIS — N18.6 ESRD (END STAGE RENAL DISEASE) (HCC): Primary | ICD-10-CM

## 2021-11-11 ENCOUNTER — HOSPITAL ENCOUNTER (OUTPATIENT)
Dept: INFUSION THERAPY | Facility: HOSPITAL | Age: 56
Discharge: HOME | End: 2021-11-11
Attending: INTERNAL MEDICINE | Admitting: INTERNAL MEDICINE

## 2021-11-11 VITALS
TEMPERATURE: 98.9 F | RESPIRATION RATE: 18 BRPM | DIASTOLIC BLOOD PRESSURE: 74 MMHG | BODY MASS INDEX: 19.84 KG/M2 | OXYGEN SATURATION: 96 % | SYSTOLIC BLOOD PRESSURE: 177 MMHG | HEART RATE: 89 BPM | HEIGHT: 73 IN

## 2021-11-11 NOTE — PROCEDURES
Procedure Name. Tunnel Dialysis Catheter Removal    Reason of Procedure: Mature AV Access in Use    Informed Consent Obtained     Anesthesia. 1 % Lidocaine    Location Right IJ    Description Of Procedure:  Patient was explained about the nature of the procedure risk-benefit complication associated with it, informed consent was obtained. patient put in a supine position , local area was scrubbed and draped by taking usual aseptic questions. 1% Xylocaine was infiltrated at the exit site and then exit site was dilated with pointed scissors and cuff adhesions were dissected .cuff was freed and then fibrin sheath was identified which was dissected and catheter was freed and pulled out the catheter.  No complications                    Blood loss 0 ml    Complications.  None

## 2021-11-11 NOTE — NURSING NOTE
TDC removal performed by Dr. SKIP Michael.       TO: 0826  Start: 0828  Finish: 0831    Patient tolerated well. Cath was intact and all parts of it was in place.

## 2021-11-11 NOTE — H&P
[facility]   HISTORY AND PHYSICAL    Patient Name: Michael Ponce  : 1965  MRN: [id number]  Primary Care Physician:  Kymberly Otto MD  Date of admission: 2021    Subjective   Subjective     Chief Complaint: Removal of tunneled TDC    HPI:    Michael Ponce is a 56 y.o. male Who has been on hemodialysis and have a mature right arm AV fistula which is in use and there is no need for tunneled dialysis catheter and because of that reason patient came in for removal of catheter    Review of Systems  Constitutional:        Weakness tiredness fatigue  Eyes:                       No blurry vision, eye discharge, eye irritation, eye pain  HEENT:                   No acute hair loss, earache and discharge, nasal congestion or discharge, sore throat, postnasal drip  Respiratory:           No shortness of breath coughing sputum production wheezing hemoptysis pleuritic chest pain  Cardiovascular:     No chest pain, orthopnea, PND, dizziness, palpitation, lower extremity edema  Gastrointestinal:   No nausea vomiting diarrhea abdominal pain constipation  Genitourinary:       No urinary incontinence, hesitancy, frequency, urgency, dysuria  Neurological:        No confusion, headache, focal weakness, numbness, dysphasia  Hematologic:         No bruising, bleeding, pallor, lymphadenopathy  Endocrine:            No coldness, hot flashes, polyuria, abnormal hair growth  Musculoskeletal:    No body pains, aches, arthritic pains, muscle pain ,muscle wasting  Psychiatric:          No low or high mood, anxiety, hallucinations, delusions  Skin.                      No rash, ulcers, bruising, itching    Personal History     Past Medical History:   Diagnosis Date   • Abnormal ECG    • Anxiety    • Arthritis    • Asthma    • Carotid artery disease ([facility])    • CHF (congestive heart failure) ([facility])    • Chronic pain syndrome    • COPD (chronic obstructive pulmonary disease) ([facility])    • Depression    • Diabetes mellitus  ([facility])    • Elevated cholesterol    • ESRD (end stage renal disease) ([facility])    • GERD (gastroesophageal reflux disease)    • History of transfusion    • Hypercholesterolemia    • Hyperlipidemia    • Hypertension    • Myocardial infarction ([facility])    • Neuropathy    • Stroke ([facility])        Past Surgical History:   Procedure Laterality Date   • AMPUTATION FOOT Right     parial   • BACK SURGERY     • CENTRAL VENOUS CATHETER TUNNELED INSERTION DOUBLE LUMEN Right    • DIALYSIS FISTULA CREATION Right    • DIALYSIS FISTULA CREATION Right    • KNEE SURGERY Right    • LUNG SURGERY      took fluid off the lung   • NECK SURGERY Bilateral    • REPLACEMENT TOTAL KNEE Right    • SHOULDER ARTHROSCOPY     • SHUNT O GRAM Left 9/17/2021    Procedure: dialysis shuntogram;  Surgeon: Gigi Rivera MD;  Location: [facility] INVASIVE LOC[facility];  Service: Peripheral Vascular;  Laterality: Left;   • TOTAL SHOULDER ARTHROPLASTY Right 07/12/2016    with biceps tenodesis       Family History: family history includes Heart attack in his father and mother; Heart disease in his father and mother. Otherwise pertinent FHx was reviewed and not pertinent to current issue.    Social History:  reports that he has been smoking. He has been smoking about 1.50 packs per day. He has never used smokeless tobacco. He reports previous drug use. He reports that he does not drink alcohol.    Home Medications:  HYDROcodone-acetaminophen, albuterol sulfate HFA, carvedilol, furosemide, and metOLazone      Allergies:  Allergies   Allergen Reactions   • Ascorbate Shortness Of Breath   • Contrast Dye Anaphylaxis   • Red Dye Itching and Shortness Of Breath   • Strawberry Anaphylaxis   • Sulfa Antibiotics Anaphylaxis   • Morphine Itching   • Penicillins Unknown - Low Severity       Objective   Objective     Vitals:   Temp:  [98.9 °F (37.2 °C)] 98.9 °F (37.2 °C)  Heart Rate:  [89] 89  Resp:  [18] 18  BP: (177)/(74) 177/74  Physical Exam               Constitutional:          Awake, alert responsive, conversant, no obvious distress   Eyes:                       PERRLA, sclerae anicteric, no conjunctival injection   HEENT:                   Moist mucous membranes, no nasal or eye discharge, no throat congestion   Neck:                      Supple, no thyromegaly, no lymphadenopathy, trachea midline, no elevated JVD   Respiratory:           Clear to auscultation bilaterally, nonlabored respirations    Cardiovascular:     RRR, no murmurs, rubs, or gallops, palpable pedal pulses bilaterally,No bilateral ankle edema   Gastrointestinal:   Positive bowel sounds, soft, nontender, nondistended, no organomegaly   Musculoskeletal:   No clubbing or cyanosis to extremities, muscle wasting, joint swelling, muscle weakness   Psychiatric:             Appropriate affect, cooperative   Neurologic:            Awake alert ,oriented x 3, strength symmetric in all extremities, Cranial Nerves grossly intact to confrontation, speech clear   Skin:                      No rashes, bruising, skin ulcers, petechiae or ecchymosis    Result Review    Result Review:  I have personally reviewed the results from the time of this admission to 11/11/2021 13:06 EST and agree with these findings:  []  Laboratory  []  Microbiology  []  Radiology  []  EKG/Telemetry   []  Cardiology/Vascular   []  Pathology  []  Old records  []  Other:    Assessment/Plan   Assessment / Plan     Active Hospital Problems:  Active Hospital Problems    Diagnosis    • End stage renal disease on dialysis (HCC)      Added automatically from request for surgery [id number]         Plan:   Tunneled dialysis catheter removal    DVT prophylaxis:  No DVT prophylaxis order currently exists.    CODE STATUS:       Admission Status:  I believe this patient meets Outpatient status.    Electronically signed by Sloan Michael MD, 11/11/21, 1:06 PM EST.

## 2022-03-27 PROBLEM — I50.32 DIASTOLIC CHF, CHRONIC (HCC): Status: ACTIVE | Noted: 2022-03-27

## 2023-05-03 ENCOUNTER — OFFICE VISIT (OUTPATIENT)
Dept: VASCULAR SURGERY | Facility: HOSPITAL | Age: 58
End: 2023-05-03
Payer: MEDICARE

## 2023-05-03 VITALS
HEART RATE: 88 BPM | OXYGEN SATURATION: 95 % | RESPIRATION RATE: 18 BRPM | SYSTOLIC BLOOD PRESSURE: 120 MMHG | DIASTOLIC BLOOD PRESSURE: 64 MMHG | TEMPERATURE: 97.9 F

## 2023-05-03 DIAGNOSIS — Z99.2 ESRD ON DIALYSIS: Primary | ICD-10-CM

## 2023-05-03 DIAGNOSIS — N18.6 ESRD ON DIALYSIS: Primary | ICD-10-CM

## 2023-05-03 PROCEDURE — G0463 HOSPITAL OUTPT CLINIC VISIT: HCPCS | Performed by: SURGERY

## 2023-05-03 RX ORDER — METHYLPREDNISOLONE SODIUM SUCCINATE 40 MG/ML
100 INJECTION, POWDER, LYOPHILIZED, FOR SOLUTION INTRAMUSCULAR; INTRAVENOUS ONCE
OUTPATIENT
Start: 2023-05-03

## 2023-05-03 RX ORDER — DIPHENHYDRAMINE HYDROCHLORIDE 50 MG/ML
50 INJECTION INTRAMUSCULAR; INTRAVENOUS ONCE
OUTPATIENT
Start: 2023-05-03

## 2023-05-03 RX ORDER — CEFAZOLIN SODIUM 2 G/100ML
2 INJECTION, SOLUTION INTRAVENOUS ONCE
OUTPATIENT
Start: 2023-05-03 | End: 2023-05-03

## 2023-05-03 NOTE — PROGRESS NOTES
[facility]   Follow up Office    Patient Name: Michael Ponce  : 1965  MRN: [id number]  Primary Care Physician:  Kymberly Otto MD      Subjective   Subjective     HPI:    Michael Ponce is a 57 y.o. male here to see me due to problems with his right wrist fistula.  There has been some difficulties with flow.      Objective     Vitals:   Temp:  [97.9 °F (36.6 °C)] 97.9 °F (36.6 °C)  Heart Rate:  [88] 88  Resp:  [18] 18  BP: (120)/(64) 120/64    Physical Exam      General: Alert, no acute distress  Right forearm: Radiocephalic arteriovenous fistula with a good thrill just next to the anastomosis then it appears to dilate significantly.  Distal compression results in a strong pulse.    Assessment & Plan   Assessment / Plan     Diagnoses and all orders for this visit:    1. ESRD on dialysis (Primary)  -     Case Request; Standing  -     ceFAZolin in dextrose (ANCEF) IVPB solution 2 g  -     methylPREDNISolone sodium succinate (SOLU-Medrol) injection 100 mg  -     diphenhydrAMINE (BENADRYL) injection 50 mg  -     Case Request    Other orders  -     Follow Anesthesia Guidelines / Protocol; Future  -     Follow Anesthesia Guidelines / Protocol; Standing  -     Verify NPO Status; Standing  -     Obtain Informed Consent; Standing  -     CBC & Differential; Standing  -     Basic Metabolic Panel; Standing       Assessment/Plan:   Malfunctioning right arm fistula.  Suspect inflow problems.  We will plan a fistulogram.  I have discussed with the patient in detail the mechanics of the procedure, the indications, benefits, risks, alternatives as well as potential complications to include but not limited to infection, bleeding, inability to improve the fistula, vascular injury requiring surgical repair.  He appears to understand and desires to proceed.        Electronically signed by Jacob Chen MD, 23, 12:40 PM EDT.

## 2023-05-15 ENCOUNTER — TELEPHONE (OUTPATIENT)
Dept: VASCULAR SURGERY | Facility: HOSPITAL | Age: 58
End: 2023-05-15

## 2023-05-15 NOTE — TELEPHONE ENCOUNTER
Caller: Michael Ponce    Relationship: Self    Best call back number:[phone]    What is the best time to reach you: ANY    Who are you requesting to speak with (clinical staff, provider,  specific staff member): CLINICAL    Do you know the name of the person who called: PATIENT    What was the call regarding:   PATIENT SURGERY WAS CANCELED ON MAY 11TH WITH DR. PEREZ. PATIENT WOULD LIKE A CALL ONCE SURGERY HAS BEEN RESCHEDULED.    Do you require a callback: YES

## 2023-05-17 ENCOUNTER — PREP FOR SURGERY (OUTPATIENT)
Dept: OTHER | Facility: HOSPITAL | Age: 58
End: 2023-05-17
Payer: MEDICARE

## 2023-05-17 DIAGNOSIS — Z99.2 ESRD ON DIALYSIS: ICD-10-CM

## 2023-05-17 DIAGNOSIS — T82.590A DIALYSIS AV FISTULA MALFUNCTION, INITIAL ENCOUNTER: Primary | ICD-10-CM

## 2023-05-17 DIAGNOSIS — N18.6 ESRD ON DIALYSIS: ICD-10-CM

## 2023-05-17 RX ORDER — CEFAZOLIN SODIUM 2 G/100ML
2 INJECTION, SOLUTION INTRAVENOUS ONCE
OUTPATIENT
Start: 2023-05-17 | End: 2023-05-17

## 2023-05-17 RX ORDER — DIPHENHYDRAMINE HYDROCHLORIDE 50 MG/ML
50 INJECTION INTRAMUSCULAR; INTRAVENOUS ONCE
OUTPATIENT
Start: 2023-05-17

## 2023-05-17 RX ORDER — METHYLPREDNISOLONE SODIUM SUCCINATE 40 MG/ML
40 INJECTION, POWDER, LYOPHILIZED, FOR SOLUTION INTRAMUSCULAR; INTRAVENOUS ONCE
OUTPATIENT
Start: 2023-05-17

## (undated) DEVICE — KT INTRO MIC VSI SMOTH REG 4F 40CM 7CM